# Patient Record
Sex: MALE | Race: WHITE | URBAN - METROPOLITAN AREA
[De-identification: names, ages, dates, MRNs, and addresses within clinical notes are randomized per-mention and may not be internally consistent; named-entity substitution may affect disease eponyms.]

---

## 2017-03-30 PROBLEM — I25.10 CORONARY ARTERY DISEASE INVOLVING NATIVE CORONARY ARTERY OF NATIVE HEART WITHOUT ANGINA PECTORIS: Status: ACTIVE | Noted: 2017-03-30

## 2025-01-22 ENCOUNTER — HOSPITAL ENCOUNTER (OUTPATIENT)
Dept: CT IMAGING | Facility: HOSPITAL | Age: 80
Discharge: HOME | End: 2025-01-22
Attending: SURGERY
Payer: MEDICARE

## 2025-01-22 ENCOUNTER — HOSPITAL ENCOUNTER (OUTPATIENT)
Dept: GENERAL RADIOLOGY | Facility: HOSPITAL | Age: 80
Discharge: HOME | End: 2025-01-22
Attending: SURGERY
Payer: MEDICARE

## 2025-01-22 VITALS
SYSTOLIC BLOOD PRESSURE: 187 MMHG | HEART RATE: 56 BPM | RESPIRATION RATE: 12 BRPM | DIASTOLIC BLOOD PRESSURE: 62 MMHG | OXYGEN SATURATION: 95 %

## 2025-01-22 VITALS
RESPIRATION RATE: 13 BRPM | OXYGEN SATURATION: 96 % | DIASTOLIC BLOOD PRESSURE: 75 MMHG | SYSTOLIC BLOOD PRESSURE: 151 MMHG | HEART RATE: 57 BPM

## 2025-01-22 DIAGNOSIS — I71.41 PARARENAL ABDOMINAL AORTIC ANEURYSM (AAA) WITHOUT RUPTURE: ICD-10-CM

## 2025-01-22 LAB
CREAT BLD-MCNC: 2.4 MG/DL
EGFRCR SERPLBLD CKD-EPI 2021: 27 ML/MIN/1.73M2 (ref 60–?)

## 2025-01-22 PROCEDURE — 82565 ASSAY OF CREATININE: CPT

## 2025-01-22 PROCEDURE — 96361 HYDRATE IV INFUSION ADD-ON: CPT

## 2025-01-22 PROCEDURE — 96360 HYDRATION IV INFUSION INIT: CPT

## 2025-01-22 PROCEDURE — 74174 CTA ABD&PLVS W/CONTRAST: CPT | Performed by: SURGERY

## 2025-01-22 NOTE — IMAGING NOTE
Received SBAR from JOSHUA Stover at 1100.  Patient receiving IV hydration s/p CT scan. Continuous monitoring per protocol, see flowsheet documentation. Hydration completed at 1120. Pt tolerated well. IV discontinued. Patient discharged in stable condition.

## 2025-01-22 NOTE — IMAGING NOTE
Received Jeff in [facility] for post CT IV hydration. Please see flow-sheets and MAR for vital signs and/ or additional information. SBAR given to Jolanta Toussaint RN

## 2025-01-29 RX ORDER — CHOLECALCIFEROL (VITAMIN D3) 25 MCG
1000 TABLET ORAL DAILY
COMMUNITY

## 2025-01-29 RX ORDER — SODIUM BICARBONATE 650 MG/1
1500 TABLET ORAL NIGHTLY
COMMUNITY
End: 2025-02-13 | Stop reason: CLARIF

## 2025-01-29 NOTE — PAT NURSING NOTE
PAT nursing note: spoke with daughter Tip; shower with antibacterial soap; npo after 2200; continue to take all prescribed medications as directed except: the morning of surgery only take Nifedipine and ASA  with a sip of water; last dose of vitamins, supplements, Omega 3 fish oil, herbal products and NSAIDs 6/12, ASA 1/29, Glipizide 2/4 am; denies taking blood thinners, ACEs, ARBs, diabetic or weight loss medications; no PPM, ICD, no nerve or spinal cord stimulator; admit; 2 visitors welcome; park in the [place] [employer] garage at [facility]; register at the [facility] reception desk in the lobby at 0630 am; keep personal possessions to a minimum: bring insurance card(s)/picture ID, slippers, toiletries, wear comfortable clothing, bring glasses/eye glass case, bring denture cup/supplies; leave all valuables at home, including jewelry; discussed intra-op and post-op instructions; all questions answered; daughter verbalized understanding of all instructions; labs/EKG 1/31 @0900/[facility].

## 2025-01-31 ENCOUNTER — HOSPITAL ENCOUNTER (OUTPATIENT)
Dept: LAB | Facility: HOSPITAL | Age: 80
Discharge: HOME | End: 2025-01-31
Attending: SURGERY
Payer: MEDICARE

## 2025-01-31 ENCOUNTER — HOSPITAL ENCOUNTER (OUTPATIENT)
Dept: CV DIAGNOSTICS | Facility: HOSPITAL | Age: 80
Discharge: HOME | End: 2025-01-31
Attending: SURGERY
Payer: MEDICARE

## 2025-01-31 DIAGNOSIS — I71.40 AAA (ABDOMINAL AORTIC ANEURYSM): ICD-10-CM

## 2025-01-31 DIAGNOSIS — Z01.818 PRE-OP TESTING: ICD-10-CM

## 2025-01-31 LAB
ALBUMIN SERPL-MCNC: 4.4 G/DL (ref 3.2–4.8)
ALBUMIN/GLOB SERPL: 1.4 {RATIO} (ref 1–2)
ALP LIVER SERPL-CCNC: 80 U/L
ALT SERPL-CCNC: 13 U/L
ANION GAP SERPL CALC-SCNC: 7 MMOL/L (ref 0–18)
ANTIBODY SCREEN: NEGATIVE
APTT PPP: 23.3 SECONDS (ref 23–36)
AST SERPL-CCNC: 24 U/L (ref ?–34)
ATRIAL RATE: 67 BPM
BASOPHILS # BLD AUTO: 0.09 X10(3) UL (ref 0–0.2)
BASOPHILS NFR BLD AUTO: 1.2 %
BILIRUB SERPL-MCNC: 0.4 MG/DL (ref 0.2–1.1)
BUN BLD-MCNC: 29 MG/DL (ref 9–23)
CALCIUM BLD-MCNC: 9.4 MG/DL (ref 8.7–10.6)
CHLORIDE SERPL-SCNC: 106 MMOL/L (ref 98–112)
CO2 SERPL-SCNC: 25 MMOL/L (ref 21–32)
CREAT BLD-MCNC: 1.91 MG/DL
EGFRCR SERPLBLD CKD-EPI 2021: 35 ML/MIN/1.73M2 (ref 60–?)
EOSINOPHIL # BLD AUTO: 0.69 X10(3) UL (ref 0–0.7)
EOSINOPHIL NFR BLD AUTO: 8.9 %
ERYTHROCYTE [DISTWIDTH] IN BLOOD BY AUTOMATED COUNT: 14.6 %
GLOBULIN PLAS-MCNC: 3.1 G/DL (ref 2–3.5)
GLUCOSE BLD-MCNC: 143 MG/DL (ref 70–99)
HCT VFR BLD AUTO: 38 %
HGB BLD-MCNC: 12.8 G/DL
IMM GRANULOCYTES # BLD AUTO: 0.02 X10(3) UL (ref 0–1)
IMM GRANULOCYTES NFR BLD: 0.3 %
INR BLD: 1.07 (ref 0.8–1.2)
LYMPHOCYTES # BLD AUTO: 2.18 X10(3) UL (ref 1–4)
LYMPHOCYTES NFR BLD AUTO: 28.1 %
MCH RBC QN AUTO: 35.4 PG (ref 26–34)
MCHC RBC AUTO-ENTMCNC: 33.7 G/DL (ref 31–37)
MCV RBC AUTO: 105 FL
MONOCYTES # BLD AUTO: 0.82 X10(3) UL (ref 0.1–1)
MONOCYTES NFR BLD AUTO: 10.6 %
NEUTROPHILS # BLD AUTO: 3.95 X10 (3) UL (ref 1.5–7.7)
NEUTROPHILS # BLD AUTO: 3.95 X10(3) UL (ref 1.5–7.7)
NEUTROPHILS NFR BLD AUTO: 50.9 %
OSMOLALITY SERPL CALC.SUM OF ELEC: 294 MOSM/KG (ref 275–295)
P AXIS: 63 DEGREES
P-R INTERVAL: 192 MS
PLATELET # BLD AUTO: 202 10(3)UL (ref 150–450)
POTASSIUM SERPL-SCNC: 4.2 MMOL/L (ref 3.5–5.1)
PROT SERPL-MCNC: 7.5 G/DL (ref 5.7–8.2)
PROTHROMBIN TIME: 14 SECONDS (ref 11.6–14.8)
Q-T INTERVAL: 412 MS
QRS DURATION: 126 MS
QTC CALCULATION (BEZET): 435 MS
R AXIS: 21 DEGREES
RBC # BLD AUTO: 3.62 X10(6)UL
RH BLOOD TYPE: POSITIVE
SODIUM SERPL-SCNC: 138 MMOL/L (ref 136–145)
T AXIS: 55 DEGREES
VENTRICULAR RATE: 67 BPM
WBC # BLD AUTO: 7.8 X10(3) UL (ref 4–11)

## 2025-01-31 PROCEDURE — 36415 COLL VENOUS BLD VENIPUNCTURE: CPT | Performed by: SURGERY

## 2025-01-31 PROCEDURE — 86901 BLOOD TYPING SEROLOGIC RH(D): CPT | Performed by: SURGERY

## 2025-01-31 PROCEDURE — 93005 ELECTROCARDIOGRAM TRACING: CPT

## 2025-01-31 PROCEDURE — 85730 THROMBOPLASTIN TIME PARTIAL: CPT | Performed by: SURGERY

## 2025-01-31 PROCEDURE — 80053 COMPREHEN METABOLIC PANEL: CPT | Performed by: SURGERY

## 2025-01-31 PROCEDURE — 85025 COMPLETE CBC W/AUTO DIFF WBC: CPT | Performed by: SURGERY

## 2025-01-31 PROCEDURE — 86850 RBC ANTIBODY SCREEN: CPT | Performed by: SURGERY

## 2025-01-31 PROCEDURE — 85610 PROTHROMBIN TIME: CPT | Performed by: SURGERY

## 2025-01-31 PROCEDURE — 93010 ELECTROCARDIOGRAM REPORT: CPT | Performed by: INTERNAL MEDICINE

## 2025-01-31 PROCEDURE — 86900 BLOOD TYPING SEROLOGIC ABO: CPT | Performed by: SURGERY

## 2025-01-31 PROCEDURE — 86920 COMPATIBILITY TEST SPIN: CPT

## 2025-02-04 ENCOUNTER — ANESTHESIA EVENT (OUTPATIENT)
Dept: CARDIAC SURGERY | Facility: HOSPITAL | Age: 80
End: 2025-02-04
Payer: MEDICARE

## 2025-02-04 NOTE — H&P
[facility]    PATIENT'S NAME: TIFFANIE SIERRA   ATTENDING PHYSICIAN: Eduin Hatch M.D.   PATIENT ACCOUNT#:   [id number]    LOCATION:    MEDICAL RECORD #:   [id number]       YOB: 1945  ADMISSION DATE:       02/05/2025    HISTORY AND PHYSICAL EXAMINATION    HISTORY OF PRESENT ILLNESS:  An 79-year-old white male referred by Dr. Geo Najera and Dr. Kevin Del Toro.  The patient has an infrarenal aortic aneurysm, about 5.3 to 5.4 cm.  Currently, he has no abdominal pain, no back pain.  He has really 1 functioning kidney, and it is his left kidney, with an accessory renal artery on the left side.  The patient had been seen before by my partner, Dr. Wheelre, for a 4.5 cm back in 2019, referred back to us again because of enlargement.      PAST MEDICAL HISTORY:  He has a history of some difficulty with his right knee and his right hip and difficulty with dorsiflexion of that right foot.      PAST SURGICAL HISTORY:  The patient has had coronary artery bypass surgery by Dr. Wheeler back in 2004, no other cardiac intervention since that time.  He had bilateral total knee replacement surgeries.      MEDICATIONS:  Metoprolol succinate, nifedipine, tramadol, allopurinol, glipizide, atorvastatin, aspirin, and Zetia.      ALLERGIES:  He has no known allergies.      SOCIAL HISTORY:  He is  with 3 children.  His wife is at home.  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  He is a [place]  too.  He quit smoking in 2004.  No EtOH abuse or drug abuse.      REVIEW OF SYSTEMS:  Currently, no gangrene, tissue loss, ulceration, or rest pain lower legs.  Currently, no chest pain, shortness of breath, or previous MI.  He has been cleared by Cardiology for the surgery. He has no CVA, TIA, visual field defect, or aphasia.  He denies any hematuria, dysuria, hemoptysis, cough, or sputum production.  No weight loss, fever, or chills.  No hematemesis or bright red blood per rectum.         PHYSICAL EXAMINATION:    GENERAL:  He is awake, alert.  He is appropriate, in no acute distress.    VITAL SIGNS:  Blood pressure 130/70, heart rate 72, respirations 20.    HEENT:  Pupils are equal and round.  Sclerae clear.  Mouth without lesions.   NECK:  No cervical bruit.  No JVD.  LUNGS:  Clear to auscultation.  No rales or rhonchi.  HEART:  Normal, no murmur.  ABDOMEN:  Soft.  No tenderness or masses.  The aorta is palpable, nontender, maybe slightly enlarged.    EXTREMITIES:  Femoral, popliteal, and pedal pulses palpable.  The pedal might be slightly diminished bilaterally.  Upper extremity pulses are palpable.  NEUROLOGIC:  He is moving all 4 extremities.  He has some problems with the right foot where it is slightly weak with neurological function of both the foot, knee, and the hip.  Some difficulty with ambulating.    LABORATORY DATA:  His creatinine goes up as high as 1.9 and GFR 35.    CT angiogram reviewed, which shows about a 5.3 to 5.4 cm aneurysm with a neck.      IMPRESSION:  I have recommended stent graft repair of the aortic aneurysm with the risks and complications including death, myocardial infarction, bleeding, infection, stroke, limb loss, graft thrombosis, future graft thrombosis, endoleak, graft structural fatigue, renal failure, colon ischemia, multisystem organ failure, and a need for followup.  He is aware of the possibility for endoleak and treatment in the future.  The option for no treatment versus open repair was also discussed.  The patient had all questions answered.    Dictated By Eduin Hatch M.D.  d: 02/04/2025 10:46:12  t: 02/04/2025 12:40:57  [facility] [id number]  [facility]/

## 2025-02-05 ENCOUNTER — HOSPITAL ENCOUNTER (INPATIENT)
Facility: HOSPITAL | Age: 80
LOS: 2 days | Discharge: HOME | End: 2025-02-07
Attending: SURGERY | Admitting: SURGERY
Payer: MEDICARE

## 2025-02-05 ENCOUNTER — ANESTHESIA (OUTPATIENT)
Dept: CARDIAC SURGERY | Facility: HOSPITAL | Age: 80
End: 2025-02-05
Payer: MEDICARE

## 2025-02-05 DIAGNOSIS — Z01.818 PRE-OP TESTING: ICD-10-CM

## 2025-02-05 DIAGNOSIS — I71.40 AAA (ABDOMINAL AORTIC ANEURYSM): Primary | ICD-10-CM

## 2025-02-05 PROBLEM — Z98.890 STATUS POST VASCULAR SURGERY: Status: ACTIVE | Noted: 2025-02-05

## 2025-02-05 PROBLEM — E11.42 TYPE 2 DIABETES MELLITUS WITH PERIPHERAL NEUROPATHY (HCC): Status: ACTIVE | Noted: 2025-02-05

## 2025-02-05 PROBLEM — I16.0 HYPERTENSIVE URGENCY: Status: ACTIVE | Noted: 2025-02-05

## 2025-02-05 PROBLEM — N18.9 CHRONIC KIDNEY DISEASE: Status: ACTIVE | Noted: 2025-02-05

## 2025-02-05 PROBLEM — I10 PRIMARY HYPERTENSION: Status: ACTIVE | Noted: 2025-02-05

## 2025-02-05 PROBLEM — E78.5 HYPERLIPIDEMIA: Status: ACTIVE | Noted: 2025-02-05

## 2025-02-05 LAB
ANION GAP SERPL CALC-SCNC: 7 MMOL/L (ref 0–18)
APTT PPP: 157.3 SECONDS (ref 23–36)
BUN BLD-MCNC: 24 MG/DL (ref 9–23)
CALCIUM BLD-MCNC: 8.2 MG/DL (ref 8.7–10.6)
CHLORIDE SERPL-SCNC: 109 MMOL/L (ref 98–112)
CO2 SERPL-SCNC: 22 MMOL/L (ref 21–32)
CREAT BLD-MCNC: 1.64 MG/DL
EGFRCR SERPLBLD CKD-EPI 2021: 42 ML/MIN/1.73M2 (ref 60–?)
ERYTHROCYTE [DISTWIDTH] IN BLOOD BY AUTOMATED COUNT: 14.6 %
EST. AVERAGE GLUCOSE BLD GHB EST-MCNC: 126 MG/DL (ref 68–126)
GLUCOSE BLD-MCNC: 132 MG/DL (ref 70–99)
GLUCOSE BLD-MCNC: 142 MG/DL (ref 70–99)
GLUCOSE BLD-MCNC: 201 MG/DL (ref 70–99)
HBA1C MFR BLD: 6 % (ref ?–5.7)
HCT VFR BLD AUTO: 28.5 %
HGB BLD-MCNC: 9.9 G/DL
ISTAT ACTIVATED CLOTTING TIME: 112 SECONDS (ref 74–137)
MCH RBC QN AUTO: 36 PG (ref 26–34)
MCHC RBC AUTO-ENTMCNC: 34.7 G/DL (ref 31–37)
MCV RBC AUTO: 103.6 FL
MRSA DNA SPEC QL NAA+PROBE: NEGATIVE
OSMOLALITY SERPL CALC.SUM OF ELEC: 292 MOSM/KG (ref 275–295)
PLATELET # BLD AUTO: 153 10(3)UL (ref 150–450)
PLATELETS.RETICULATED NFR BLD AUTO: 6 % (ref 0–7)
POTASSIUM SERPL-SCNC: 4.4 MMOL/L (ref 3.5–5.1)
RBC # BLD AUTO: 2.75 X10(6)UL
RH BLOOD TYPE: POSITIVE
SODIUM SERPL-SCNC: 138 MMOL/L (ref 136–145)
WBC # BLD AUTO: 6.7 X10(3) UL (ref 4–11)

## 2025-02-05 PROCEDURE — B41D1ZZ FLUOROSCOPY OF AORTA AND BILATERAL LOWER EXTREMITY ARTERIES USING LOW OSMOLAR CONTRAST: ICD-10-PCS | Performed by: SURGERY

## 2025-02-05 PROCEDURE — 04V03DZ RESTRICTION OF ABDOMINAL AORTA WITH INTRALUMINAL DEVICE, PERCUTANEOUS APPROACH: ICD-10-PCS | Performed by: SURGERY

## 2025-02-05 PROCEDURE — 99223 1ST HOSP IP/OBS HIGH 75: CPT | Performed by: INTERNAL MEDICINE

## 2025-02-05 DEVICE — EXCLUDER CONFORM AAA TRNK ENDO 28.5MMX14.5MMX12CM 16FR
Type: IMPLANTABLE DEVICE | Status: FUNCTIONAL
Brand: GORE EXCLUDER CONF AAA ENDO - TRUNK-IPSILATERAL

## 2025-02-05 DEVICE — EXCLUDER AAA ENDO CONTRA LEG 16MMX20MMX9.5CM 12FR
Type: IMPLANTABLE DEVICE | Status: FUNCTIONAL
Brand: GORE EXCLUDER AAA ENDOPROSTHESIS

## 2025-02-05 DEVICE — EXCLUDER AAA ENDO CONTRA LEG 16MMX16MMX13.5CM 12FR
Type: IMPLANTABLE DEVICE | Status: FUNCTIONAL
Brand: GORE EXCLUDER AAA ENDOPROSTHESIS

## 2025-02-05 DEVICE — ANGIO-SEAL VIP VASCULAR CLOSURE DEVICE
Type: IMPLANTABLE DEVICE | Site: GROIN | Status: FUNCTIONAL
Brand: ANGIO-SEAL

## 2025-02-05 RX ORDER — ATORVASTATIN CALCIUM 40 MG/1
40 TABLET, FILM COATED ORAL NIGHTLY
Status: DISCONTINUED | OUTPATIENT
Start: 2025-02-05 | End: 2025-02-07

## 2025-02-05 RX ORDER — MORPHINE SULFATE 4 MG/ML
4 INJECTION, SOLUTION INTRAMUSCULAR; INTRAVENOUS EVERY 2 HOUR PRN
Status: DISCONTINUED | OUTPATIENT
Start: 2025-02-05 | End: 2025-02-07

## 2025-02-05 RX ORDER — DEXTROSE MONOHYDRATE 25 G/50ML
50 INJECTION, SOLUTION INTRAVENOUS
Status: DISCONTINUED | OUTPATIENT
Start: 2025-02-05 | End: 2025-02-07

## 2025-02-05 RX ORDER — ONDANSETRON 2 MG/ML
4 INJECTION INTRAMUSCULAR; INTRAVENOUS EVERY 6 HOURS PRN
Status: DISCONTINUED | OUTPATIENT
Start: 2025-02-05 | End: 2025-02-07

## 2025-02-05 RX ORDER — METOPROLOL SUCCINATE 25 MG/1
25 TABLET, EXTENDED RELEASE ORAL EVERY EVENING
Status: DISCONTINUED | OUTPATIENT
Start: 2025-02-06 | End: 2025-02-07

## 2025-02-05 RX ORDER — NITROGLYCERIN 20 MG/100ML
INJECTION INTRAVENOUS
Status: DISCONTINUED | OUTPATIENT
Start: 2025-02-05 | End: 2025-02-06

## 2025-02-05 RX ORDER — GABAPENTIN 100 MG/1
200 CAPSULE ORAL 3 TIMES DAILY
Status: DISCONTINUED | OUTPATIENT
Start: 2025-02-05 | End: 2025-02-07

## 2025-02-05 RX ORDER — FAMOTIDINE 20 MG/1
20 TABLET, FILM COATED ORAL NIGHTLY
Status: DISCONTINUED | OUTPATIENT
Start: 2025-02-05 | End: 2025-02-07

## 2025-02-05 RX ORDER — MIDAZOLAM HYDROCHLORIDE 1 MG/ML
1 INJECTION INTRAMUSCULAR; INTRAVENOUS EVERY 5 MIN PRN
Status: ACTIVE | OUTPATIENT
Start: 2025-02-05 | End: 2025-02-05

## 2025-02-05 RX ORDER — ONDANSETRON 2 MG/ML
INJECTION INTRAMUSCULAR; INTRAVENOUS AS NEEDED
Status: DISCONTINUED | OUTPATIENT
Start: 2025-02-05 | End: 2025-02-05 | Stop reason: SURG

## 2025-02-05 RX ORDER — PROTAMINE SULFATE 10 MG/ML
INJECTION, SOLUTION INTRAVENOUS AS NEEDED
Status: DISCONTINUED | OUTPATIENT
Start: 2025-02-05 | End: 2025-02-05 | Stop reason: SURG

## 2025-02-05 RX ORDER — SODIUM CHLORIDE 9 MG/ML
INJECTION, SOLUTION INTRAVENOUS CONTINUOUS
Status: DISCONTINUED | OUTPATIENT
Start: 2025-02-05 | End: 2025-02-06

## 2025-02-05 RX ORDER — EZETIMIBE 10 MG/1
10 TABLET ORAL NIGHTLY
Status: DISCONTINUED | OUTPATIENT
Start: 2025-02-05 | End: 2025-02-07

## 2025-02-05 RX ORDER — NIFEDIPINE 30 MG/1
30 TABLET, EXTENDED RELEASE ORAL DAILY
Status: DISCONTINUED | OUTPATIENT
Start: 2025-02-06 | End: 2025-02-05

## 2025-02-05 RX ORDER — METOCLOPRAMIDE HYDROCHLORIDE 5 MG/ML
5 INJECTION INTRAMUSCULAR; INTRAVENOUS EVERY 8 HOURS PRN
Status: DISCONTINUED | OUTPATIENT
Start: 2025-02-05 | End: 2025-02-07

## 2025-02-05 RX ORDER — ONDANSETRON 2 MG/ML
4 INJECTION INTRAMUSCULAR; INTRAVENOUS ONCE AS NEEDED
Status: ACTIVE | OUTPATIENT
Start: 2025-02-05 | End: 2025-02-05

## 2025-02-05 RX ORDER — MIDAZOLAM HYDROCHLORIDE 1 MG/ML
INJECTION INTRAMUSCULAR; INTRAVENOUS AS NEEDED
Status: DISCONTINUED | OUTPATIENT
Start: 2025-02-05 | End: 2025-02-05 | Stop reason: SURG

## 2025-02-05 RX ORDER — GLYCOPYRROLATE 0.2 MG/ML
INJECTION, SOLUTION INTRAMUSCULAR; INTRAVENOUS AS NEEDED
Status: DISCONTINUED | OUTPATIENT
Start: 2025-02-05 | End: 2025-02-05 | Stop reason: SURG

## 2025-02-05 RX ORDER — FAMOTIDINE 10 MG/ML
20 INJECTION, SOLUTION INTRAVENOUS NIGHTLY
Status: DISCONTINUED | OUTPATIENT
Start: 2025-02-05 | End: 2025-02-07

## 2025-02-05 RX ORDER — METOCLOPRAMIDE HYDROCHLORIDE 5 MG/ML
5 INJECTION INTRAMUSCULAR; INTRAVENOUS ONCE AS NEEDED
Status: ACTIVE | OUTPATIENT
Start: 2025-02-05 | End: 2025-02-05

## 2025-02-05 RX ORDER — IODIXANOL 320 MG/ML
100 INJECTION, SOLUTION INTRAVASCULAR
Status: COMPLETED | OUTPATIENT
Start: 2025-02-05 | End: 2025-02-05

## 2025-02-05 RX ORDER — MORPHINE SULFATE 2 MG/ML
2 INJECTION, SOLUTION INTRAMUSCULAR; INTRAVENOUS EVERY 2 HOUR PRN
Status: DISCONTINUED | OUTPATIENT
Start: 2025-02-05 | End: 2025-02-07

## 2025-02-05 RX ORDER — NICOTINE POLACRILEX 4 MG
15 LOZENGE BUCCAL
Status: DISCONTINUED | OUTPATIENT
Start: 2025-02-05 | End: 2025-02-07

## 2025-02-05 RX ORDER — HYDROCODONE BITARTRATE AND ACETAMINOPHEN 5; 325 MG/1; MG/1
2 TABLET ORAL EVERY 4 HOURS PRN
Status: DISCONTINUED | OUTPATIENT
Start: 2025-02-05 | End: 2025-02-07

## 2025-02-05 RX ORDER — ASPIRIN 81 MG/1
81 TABLET ORAL DAILY
Status: DISCONTINUED | OUTPATIENT
Start: 2025-02-05 | End: 2025-02-07

## 2025-02-05 RX ORDER — DEXAMETHASONE SODIUM PHOSPHATE 4 MG/ML
VIAL (ML) INJECTION AS NEEDED
Status: DISCONTINUED | OUTPATIENT
Start: 2025-02-05 | End: 2025-02-05 | Stop reason: SURG

## 2025-02-05 RX ORDER — SODIUM BICARBONATE 325 MG/1
325 TABLET ORAL DAILY
Status: DISCONTINUED | OUTPATIENT
Start: 2025-02-05 | End: 2025-02-07

## 2025-02-05 RX ORDER — MORPHINE SULFATE 2 MG/ML
1 INJECTION, SOLUTION INTRAMUSCULAR; INTRAVENOUS EVERY 2 HOUR PRN
Status: DISCONTINUED | OUTPATIENT
Start: 2025-02-05 | End: 2025-02-07

## 2025-02-05 RX ORDER — CLOPIDOGREL BISULFATE 75 MG/1
75 TABLET ORAL DAILY
Status: DISCONTINUED | OUTPATIENT
Start: 2025-02-05 | End: 2025-02-07

## 2025-02-05 RX ORDER — TRAMADOL HYDROCHLORIDE 50 MG/1
50 TABLET ORAL 2 TIMES DAILY PRN
Status: DISCONTINUED | OUTPATIENT
Start: 2025-02-05 | End: 2025-02-07

## 2025-02-05 RX ORDER — ACETAMINOPHEN 325 MG/1
650 TABLET ORAL EVERY 4 HOURS PRN
Status: DISCONTINUED | OUTPATIENT
Start: 2025-02-05 | End: 2025-02-07

## 2025-02-05 RX ORDER — METOPROLOL SUCCINATE 25 MG/1
25 TABLET, EXTENDED RELEASE ORAL EVERY EVENING
Status: DISCONTINUED | OUTPATIENT
Start: 2025-02-05 | End: 2025-02-05

## 2025-02-05 RX ORDER — HEPARIN SODIUM 1000 [USP'U]/ML
INJECTION, SOLUTION INTRAVENOUS; SUBCUTANEOUS AS NEEDED
Status: DISCONTINUED | OUTPATIENT
Start: 2025-02-05 | End: 2025-02-05 | Stop reason: SURG

## 2025-02-05 RX ORDER — DIPHENHYDRAMINE HYDROCHLORIDE 50 MG/ML
12.5 INJECTION INTRAMUSCULAR; INTRAVENOUS AS NEEDED
Status: ACTIVE | OUTPATIENT
Start: 2025-02-05 | End: 2025-02-05

## 2025-02-05 RX ORDER — NALOXONE HYDROCHLORIDE 0.4 MG/ML
0.08 INJECTION, SOLUTION INTRAMUSCULAR; INTRAVENOUS; SUBCUTANEOUS AS NEEDED
Status: ACTIVE | OUTPATIENT
Start: 2025-02-05 | End: 2025-02-05

## 2025-02-05 RX ORDER — SODIUM CHLORIDE 9 MG/ML
INJECTION, SOLUTION INTRAVENOUS CONTINUOUS PRN
Status: DISCONTINUED | OUTPATIENT
Start: 2025-02-05 | End: 2025-02-05 | Stop reason: SURG

## 2025-02-05 RX ORDER — ROCURONIUM BROMIDE 10 MG/ML
INJECTION, SOLUTION INTRAVENOUS AS NEEDED
Status: DISCONTINUED | OUTPATIENT
Start: 2025-02-05 | End: 2025-02-05 | Stop reason: SURG

## 2025-02-05 RX ORDER — HYDROMORPHONE HYDROCHLORIDE 1 MG/ML
0.4 INJECTION, SOLUTION INTRAMUSCULAR; INTRAVENOUS; SUBCUTANEOUS EVERY 5 MIN PRN
Status: ACTIVE | OUTPATIENT
Start: 2025-02-05 | End: 2025-02-05

## 2025-02-05 RX ORDER — METOCLOPRAMIDE HYDROCHLORIDE 5 MG/ML
10 INJECTION INTRAMUSCULAR; INTRAVENOUS ONCE AS NEEDED
Status: DISCONTINUED | OUTPATIENT
Start: 2025-02-05 | End: 2025-02-05

## 2025-02-05 RX ORDER — NICOTINE POLACRILEX 4 MG
30 LOZENGE BUCCAL
Status: DISCONTINUED | OUTPATIENT
Start: 2025-02-05 | End: 2025-02-07

## 2025-02-05 RX ORDER — HEPARIN SODIUM 5000 [USP'U]/ML
5000 INJECTION, SOLUTION INTRAVENOUS; SUBCUTANEOUS ONCE
Status: DISCONTINUED | OUTPATIENT
Start: 2025-02-05 | End: 2025-02-05 | Stop reason: HOSPADM

## 2025-02-05 RX ORDER — HYDROCODONE BITARTRATE AND ACETAMINOPHEN 5; 325 MG/1; MG/1
1 TABLET ORAL EVERY 4 HOURS PRN
Status: DISCONTINUED | OUTPATIENT
Start: 2025-02-05 | End: 2025-02-07

## 2025-02-05 RX ORDER — ALLOPURINOL 100 MG/1
100 TABLET ORAL 2 TIMES DAILY
Status: DISCONTINUED | OUTPATIENT
Start: 2025-02-05 | End: 2025-02-07

## 2025-02-05 RX ORDER — SODIUM CHLORIDE, SODIUM LACTATE, POTASSIUM CHLORIDE, CALCIUM CHLORIDE 600; 310; 30; 20 MG/100ML; MG/100ML; MG/100ML; MG/100ML
INJECTION, SOLUTION INTRAVENOUS CONTINUOUS
Status: DISCONTINUED | OUTPATIENT
Start: 2025-02-05 | End: 2025-02-05

## 2025-02-05 RX ADMIN — HEPARIN SODIUM 11000 UNITS: 1000 INJECTION, SOLUTION INTRAVENOUS; SUBCUTANEOUS at 10:03:00

## 2025-02-05 RX ADMIN — HEPARIN SODIUM 2000 UNITS: 1000 INJECTION, SOLUTION INTRAVENOUS; SUBCUTANEOUS at 10:43:00

## 2025-02-05 RX ADMIN — PROTAMINE SULFATE 40 MG: 10 INJECTION, SOLUTION INTRAVENOUS at 11:10:00

## 2025-02-05 RX ADMIN — GLYCOPYRROLATE 0.3 MG: 0.2 INJECTION, SOLUTION INTRAMUSCULAR; INTRAVENOUS at 09:09:00

## 2025-02-05 RX ADMIN — ROCURONIUM BROMIDE 50 MG: 10 INJECTION, SOLUTION INTRAVENOUS at 08:46:00

## 2025-02-05 RX ADMIN — HEPARIN SODIUM 4000 UNITS: 1000 INJECTION, SOLUTION INTRAVENOUS; SUBCUTANEOUS at 10:16:00

## 2025-02-05 RX ADMIN — DEXAMETHASONE SODIUM PHOSPHATE 4 MG: 4 MG/ML VIAL (ML) INJECTION at 09:31:00

## 2025-02-05 RX ADMIN — SODIUM CHLORIDE: 9 INJECTION, SOLUTION INTRAVENOUS at 11:24:00

## 2025-02-05 RX ADMIN — ONDANSETRON 4 MG: 2 INJECTION INTRAMUSCULAR; INTRAVENOUS at 11:00:00

## 2025-02-05 RX ADMIN — MIDAZOLAM HYDROCHLORIDE 2 MG: 1 INJECTION INTRAMUSCULAR; INTRAVENOUS at 08:42:00

## 2025-02-05 RX ADMIN — SODIUM CHLORIDE: 9 INJECTION, SOLUTION INTRAVENOUS at 08:40:00

## 2025-02-05 NOTE — PLAN OF CARE
Received patient from OR. Alert and oriented X 4. No signs of distress. Pulse and site checks done per orders. Pt bedrest POD0, Laying flat X 6 hrs. Advance diet as tolerated. Family in room and updated. Nitroglycerine for hypertension. Kirkpatrick inserted per Dr. Hatch. Medications given per MAR, documentation per flowsheets.

## 2025-02-05 NOTE — ANESTHESIA PREPROCEDURE EVALUATION
PRE-OP EVALUATION    Patient Name: Jeff Morales    Admit Diagnosis: Abdominal Aortic Aneurysm    Pre-op Diagnosis: Abdominal Aortic Aneurysm    ENDOVASCULAR STENT GRAFT REPAIR    Anesthesia Procedure: ENDOVASCULAR STENT GRAFT REPAIR  ANGIOGRAM ADD-ON    Surgeons and Role:     * Eduin Hatch MD - Primary     * Mikey Jordan MD - Assisting Surgeon    Pre-op vitals reviewed.  Temp: 97.9 °F (36.6 °C)  Pulse: 55  Resp: 10  BP: 143/67  SpO2: 96 %  Body mass index is 25.84 kg/m².    Current medications reviewed.  Hospital Medications:  • heparin (Porcine) 5000 UNIT/ML injection 5,000 Units  5,000 Units Subcutaneous Once       Outpatient Medications:   Prescriptions Prior to Admission[1]    Allergies: Patient has no known allergies.      Anesthesia Evaluation    Patient summary reviewed.    Anesthetic Complications  (-) history of anesthetic complications         GI/Hepatic/Renal    Negative GI/hepatic/renal ROS.                             Cardiovascular      ECG reviewed.            (+) hypertension     (+) CAD    (+) CABG/stent (2004)                            Endo/Other      (+) diabetes   not using insulin                         Pulmonary    Negative pulmonary ROS.                       Neuro/Psych    Negative neuro/psych ROS.                              Past Surgical History:   Procedure Laterality Date   • Angiogram      left heart catheterization january 2004   • Back surgery     • Bypass surgery  01/2004    CABG SVG to Diag and Right PDA, [place] to LAD,   • Nasal surg proc unlisted      Nasal surgery       Social History     Socioeconomic History   • Marital status:    Tobacco Use   • Smoking status: Former     Types: Cigarettes   • Smokeless tobacco: Never   Vaping Use   • Vaping status: Never Used   Substance and Sexual Activity   • Alcohol use: Yes     Comment: social   • Drug use: No     History   Drug Use No     Available pre-op labs reviewed.  Lab Results   Component Value Date    WBC 7.8  01/31/2025    RBC 3.62 (L) 01/31/2025    HGB 12.8 (L) 01/31/2025    HCT 38.0 (L) 01/31/2025    .0 (H) 01/31/2025    MCH 35.4 (H) 01/31/2025    MCHC 33.7 01/31/2025    RDW 14.6 01/31/2025    .0 01/31/2025     Lab Results   Component Value Date     01/31/2025    K 4.2 01/31/2025     01/31/2025    CO2 25.0 01/31/2025    BUN 29 (H) 01/31/2025    CREATSERUM 1.91 (H) 01/31/2025     (H) 01/31/2025    CA 9.4 01/31/2025     Lab Results   Component Value Date    INR 1.07 01/31/2025         Airway      Mallampati: II  Mouth opening: 3 FB  TM distance: 4 - 6 cm  Neck ROM: limited Cardiovascular    Cardiovascular exam normal.         Dental      Dental appliance(s): upper dentures, lower dentures and partials       Pulmonary    Pulmonary exam normal.                 Other findings  Only a few remaining teeth      ASA: 3   Plan: general  NPO status verified and patient meets guidelines.  Patient has taken beta blockers in last 24 hours.      Comment: Telephone interview 2/4 19:00. GA with ETT, art line, blood line. Risks discussed including sore throat, harse voice, dental trauma, nausea/vomiting  Plan/risks discussed with: patient, spouse and child/children            Present on Admission:  **None**             [1]   Medications Prior to Admission   Medication Sig Dispense Refill Last Dose/Taking   • Cyanocobalamin (VITAMIN B 12) 500 MCG Oral Tab Take 1,500 mcg by mouth at bedtime.   Past Month   • cholecalciferol 25 MCG (1000 UT) Oral Tab Take 1 tablet (1,000 Units total) by mouth daily.   Past Month   • diphenhydrAMINE HCl (NERVINE OR) Take 1 capsule by mouth in the morning and 1 capsule before bedtime.   More than a month   • METOPROLOL SUCCINATE ER 25 MG Oral Tablet 24 Hr TAKE 1 TABLET BY MOUTH  DAILY (Patient taking differently: Take 1 tablet (25 mg total) by mouth every evening.) 90 tablet 1 2/4/2025   • NIFEdipine ER 30 MG Oral Tablet 24 Hr Take 1 tablet (30 mg total) by mouth daily. 90  tablet 0 2/5/2025 Morning   • gabapentin 100 MG Oral Cap Take 2 capsules (200 mg total) by mouth 3 (three) times daily.   2/4/2025   • traMADol HCl 50 MG Oral Tab Take 1 tablet (50 mg total) by mouth 2 (two) times daily as needed.  0 Taking As Needed   • ezetimibe 10 MG Oral Tab Take 1 tablet (10 mg total) by mouth nightly.   2/4/2025   • allopurinol 100 MG Oral Tab Take 1 tablet (100 mg total) by mouth 2 (two) times daily.   2/4/2025   • Atorvastatin Calcium 40 MG Oral Tab Take 1 tablet (40 mg total) by mouth nightly.   2/4/2025   • aspirin 325 MG Oral Tab EC Take 1 tablet (325 mg total) by mouth daily.   2/5/2025 Morning   • omega-3 fatty acids 1000 MG Oral Cap Take 2,000 mg by mouth 2 (two) times daily. 4 capsules daily   Past Month   • glipiZIDE 5 MG Oral Tablet 24 Hr Take 2 tablets (10 mg total) by mouth daily.   2/4/2025   • sodium bicarbonate 325 MG Oral Tab Take 1 tablet (325 mg total) by mouth daily.   2/4/2025   • Multiple Vitamins-Minerals (MULTIVITAMIN & MINERAL OR) Take 1 tablet by mouth daily.   Past Month

## 2025-02-05 NOTE — CONSULTS
ICU  Critical Care APRN Consult Note    NAME: Jeff Morales - [facility] - MRN: [id number] - Age: 79 year old - :1945    History Of Present Illness:  Jeff Morales is a 79 year old male with PMHx significant for chronic back pain, DM2, CKD3 with history of a solitary kidney (L), HLD, HTN, AAA, retroperitoneal fibrosis, CAD s/p CABG ()  who presented to the ICU 2/5 POD #0 s/p AAA repair with stent graft. Arrives to ICU alert and oriented x4 on room air, with nitroglycerin infusing. Family at bedside.    PMH:  Past Medical History:    Abdominal aneurysm    Aortic insufficiency    Atherosclerosis of coronary artery    Diabetes (HCC)    High blood pressure    High cholesterol    Mitral regurgitation    Neuropathy    Peripheral vascular disease    PVD (peripheral vascular disease)    Tricuspid insufficiency    Unstable angina (HCC)       Social Hx:  Social History     Socioeconomic History    Marital status:    Tobacco Use    Smoking status: Former     Types: Cigarettes    Smokeless tobacco: Never   Vaping Use    Vaping status: Never Used   Substance and Sexual Activity    Alcohol use: Yes     Comment: social    Drug use: No     Social Drivers of Health     Food Insecurity: No Food Insecurity (2025)    NCSS - Food Insecurity     Worried About Running Out of Food in the Last Year: No     Ran Out of Food in the Last Year: No   Transportation Needs: No Transportation Needs (2025)    NCSS - Transportation     Lack of Transportation: No   Housing Stability: Not At Risk (2025)    NCSS - Housing/Utilities     Has Housing: Yes     Worried About Losing Housing: No     Unable to Get Utilities: No       Family Hx:  History reviewed. No pertinent family history.      Review of Systems:   A comprehensive 10 point review of systems was completed.  Pertinent positives and negatives noted in the HPI.    Current Facility-Administered Medications   Medication Dose Route Frequency    atropine 0.1 MG/ML  injection 0.5 mg  0.5 mg Intravenous PRN    naloxone (Narcan) 0.4 MG/ML injection 0.08 mg  0.08 mg Intravenous PRN    ondansetron (Zofran) 4 MG/2ML injection 4 mg  4 mg Intravenous Once PRN    midazolam (Versed) 2 MG/2ML injection 1 mg  1 mg Intravenous Q5 Min PRN    diphenhydrAMINE (Benadryl) 50 mg/mL  injection 12.5 mg  12.5 mg Intravenous PRN    metoclopramide (Reglan) 5 mg/mL injection 5 mg  5 mg Intravenous Once PRN    acetaminophen (Tylenol) tab 650 mg  650 mg Oral Q4H PRN    Or    HYDROcodone-acetaminophen (Norco) 5-325 MG per tab 1 tablet  1 tablet Oral Q4H PRN    Or    HYDROcodone-acetaminophen (Norco) 5-325 MG per tab 2 tablet  2 tablet Oral Q4H PRN    ondansetron (Zofran) 4 MG/2ML injection 4 mg  4 mg Intravenous Q6H PRN    metoclopramide (Reglan) 5 mg/mL injection 5 mg  5 mg Intravenous Q8H PRN    sodium chloride 0.9% infusion   Intravenous Continuous    morphINE PF 2 MG/ML injection 1 mg  1 mg Intravenous Q2H PRN    Or    morphINE PF 2 MG/ML injection 2 mg  2 mg Intravenous Q2H PRN    Or    morphINE PF 4 MG/ML injection 4 mg  4 mg Intravenous Q2H PRN    nitroGLYCERIN in dextrose 5% 50 mg/250mL infusion premix  5-400 mcg/min Intravenous Titrated    niCARdipine in sodium chloride 0.86% (carDENE) 20 mg/200mL infusion premix  5-15 mg/hr Intravenous Continuous PRN    aspirin DR tab 81 mg  81 mg Oral Daily    clopidogrel (Plavix) tab 75 mg  75 mg Oral Daily    famotidine (Pepcid) tab 20 mg  20 mg Oral Nightly    Or    famotidine (Pepcid) 20 mg/2mL injection 20 mg  20 mg Intravenous Nightly    ceFAZolin (Ancef) 2g in 10mL IV syringe premix  2 g Intravenous Q8H    allopurinol (Zyloprim) tab 100 mg  100 mg Oral BID    atorvastatin (Lipitor) tab 40 mg  40 mg Oral Nightly    gabapentin (Neurontin) cap 200 mg  200 mg Oral TID    ezetimibe (Zetia) tab 10 mg  10 mg Oral Nightly    sodium bicarbonate tab 325 mg  325 mg Oral Daily    traMADol (Ultram) tab 50 mg  50 mg Oral BID PRN    metoprolol succinate ER (Toprol XL)  24 hr tab 25 mg  25 mg Oral QPM    glucose (Dex4) 15 GM/59ML oral liquid 15 g  15 g Oral Q15 Min PRN    Or    glucose (Glutose) 40% oral gel 15 g  15 g Oral Q15 Min PRN    Or    glucose-vitamin C (Dex-4) chewable tab 4 tablet  4 tablet Oral Q15 Min PRN    Or    dextrose 50% injection 50 mL  50 mL Intravenous Q15 Min PRN    Or    glucose (Dex4) 15 GM/59ML oral liquid 30 g  30 g Oral Q15 Min PRN    Or    glucose (Glutose) 40% oral gel 30 g  30 g Oral Q15 Min PRN    Or    glucose-vitamin C (Dex-4) chewable tab 8 tablet  8 tablet Oral Q15 Min PRN    insulin aspart (NovoLOG) 100 Units/mL FlexPen 1-10 Units  1-10 Units Subcutaneous TID AC and HS       OBJECTIVE  Vitals:  /72 (BP Location: Right arm)   Pulse 65   Temp 97.1 °F (36.2 °C)   Resp 12   Ht 175.3 cm (5' 9\")   Wt 177 lb 4 oz (80.4 kg)   SpO2 96%   BMI 26.18 kg/m²                Physical Exam:      General: No acute distress  Neuro: AO x3, non focal   Lungs: Clear  Cardio:  RRR  Abdomen: Soft, non tender, non distended  Extremities: Warm, no swelling . RLE bilateral groin sites present, no hematoma.    Data this admission:  CTA ABD/PEL (CPT=74174)    Result Date: 1/22/2025  CONCLUSION:  Infrarenal abdominal aortic aneurysm measuring approximately 5.3 x 4.9 cm over a length of 8.8 cm.  There is eccentric atheromatous plaque along the left lateral margin of the aneurysm lumen.  There is soft tissue in calcification in the retroperitoneum surrounding the abdominal aortic aneurysm and common iliac artery branches most consistent with chronic fibrosis.  No evidence for contrast extravasation to suggest active leak.  Mild wall thickening of proximal sigmoid colon may represent sequela of prior diverticulitis/colitis.  No surrounding inflammatory changes.  Atrophic right kidney.    LOCATION:  [id number]   Dictated by (CST): Sophie Mann MD on 1/22/2025 at 10:42 AM     Finalized by (CST): Sophie Mann MD on 1/22/2025 at 10:51 AM         Labs:  Lab Results    Component Value Date    WBC 6.7 02/05/2025    HGB 9.9 02/05/2025    HCT 28.5 02/05/2025    .0 02/05/2025    CREATSERUM 1.64 02/05/2025    BUN 24 02/05/2025     02/05/2025    K 4.4 02/05/2025     02/05/2025    CO2 22.0 02/05/2025     02/05/2025    CA 8.2 02/05/2025    .3 02/05/2025       No results found.         Assessment/Plan:     AAA POD #0 s/p repair with stent graft  - Blood presssure management per vascular surgery  - ASA/plavix/statin/zetia  - Resume PO HTN meds tomorrow  - nitroglycerin drip, wean as able  - Post-op labs pending  - Pain management  - Encourage IS  - Vascular surgery following  - Cards following    CKD with h/o solitary left kidney  H/o retroperitoneal fibrosis  - Baseline Cr 1.9  - Received 120cc IV contrast for AAA repair  - PO bicarb  - Renal following    HTN  - Resume PO meds tomorrow  - Cards following    Bradycardia  - Prior EKG with NSR rate in the 60s  - Bradycardia on tele now 50s to 60s  - Repeat EKG now  - Monitor    DM2  - A1c pending  - PTA PO meds on hold  - Accuchecks  - SSI    CAD s/o CABG  - ASA/plavix/statin/BB as above    F/E/N  - Replete electrolytes per protocol  - ADAT    Ppx  - dvt chemo ppx when ok with vascular surgery  - SCDs  - PT/OT    Lines/drains  - NAFISA  - Sue Kirkpatrick    Dispo  - Full code  - ICU monitoring        Code Status: Full Code    Plan of care discussed with intensivist, Dr. Siu.      Ade Banegas [facility]  Critical Care  [id number]

## 2025-02-05 NOTE — ANESTHESIA PROCEDURE NOTES
Arterial Line    Date/Time: 2/5/2025 8:50 AM    Performed by: Alvin Muñiz MD  Authorized by: Alvin Muñiz MD    General Information and Staff    Procedure Start:  2/5/2025 8:50 AM  Procedure End:  2/5/2025 8:55 AM  Anesthesiologist:  Alvin Muñiz MD  Performed By:  Anesthesiologist  Patient Location:  OR  Indication: continuous blood pressure monitoring and blood sampling needed    Site Identification: real time ultrasound guided and image stored and retrievable    Preanesthetic Checklist: 2 patient identifiers, IV checked, risks and benefits discussed, monitors and equipment checked, pre-op evaluation, timeout performed, anesthesia consent and sterile technique used    Procedure Details    Catheter Size:  20 G  Catheter Length:  1 and 3/4 inch  Catheter Type:  Arrow  Seldinger Technique?: Yes    Laterality:  Left  Site:  Radial artery  Site Prep: alcohol swabs and chlorhexidine    Line Secured:  Tape and Tegaderm    Assessment    Events: patient tolerated procedure well with no complications      Medications  2/5/2025 8:50 AM      Additional Comments

## 2025-02-05 NOTE — ANESTHESIA PROCEDURE NOTES
Peripheral IV  Date/Time: 2/5/2025 9:00 AM  Inserted by: Alvin Muñiz MD    Placement  Needle size: 18 G  Laterality: right  Location: hand  Local anesthetic: none  Site prep: alcohol  Technique: anatomical landmarks  Attempts: 1

## 2025-02-05 NOTE — CONSULTS
[facility]  Report of Consultation    Jeff Morales Patient Status:  Inpatient    1945 MRN [id number]   Location [facility] 4SW-A Attending Eduin Hatch MD   Hosp Day # 0 PCP Hina Comer MD     Reason for Consultation:  CKD- solitary kidney; s/p AAA repair w/ stent graft    History of Present Illness:  Jeff Morales is a a(n) 79 year old male with hx of solitary kidney (L), CKD- baseline Cr ~ 1.9, HTN, DM, CAD s/p CABG, AAA - who is admitted so ICU s/p stent graft repair of the 5.8 cm AAA- 120 ml contrast used;   Patient is doing well in ICU currently   No complains  Denies any leg pain/numbness  No abd pain  Denies any n/v   No f/c    Pt follows w/ Dr. 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- s/p prior ureteral stent w/ no sig change in renal parameters and stent ws then removed  Off acei/arb due to hyperkalemia      History:  Past Medical History:    Abdominal aneurysm    Aortic insufficiency    Atherosclerosis of coronary artery    Diabetes (HCC)    High blood pressure    High cholesterol    Mitral regurgitation    Neuropathy    Peripheral vascular disease    PVD (peripheral vascular disease)    Tricuspid insufficiency    Unstable angina (HCC)     Past Surgical History:   Procedure Laterality Date    Angiogram      left heart catheterization 2004    Back surgery      Bypass surgery  2004    CABG SVG to Diag and Right PDA, [place] to LAD,    Nasal surg proc unlisted      Nasal surgery       History reviewed. No pertinent family history.   reports that he has quit smoking. His smoking use included cigarettes. He has never used smokeless tobacco. He reports current alcohol use. He reports that he does not use drugs.    Allergies:  Allergies[1]    Current Medications:    Current Facility-Administered Medications:     atropine 0.1 MG/ML injection 0.5 mg, 0.5 mg, Intravenous, PRN    naloxone (Narcan) 0.4 MG/ML injection 0.08 mg, 0.08 mg,  Intravenous, PRN    HYDROmorphone (Dilaudid) 1 MG/ML injection 0.4 mg, 0.4 mg, Intravenous, Q5 Min PRN    fentaNYL (Sublimaze) 50 mcg/mL injection 25 mcg, 25 mcg, Intravenous, Q5 Min PRN    ondansetron (Zofran) 4 MG/2ML injection 4 mg, 4 mg, Intravenous, Once PRN    midazolam (Versed) 2 MG/2ML injection 1 mg, 1 mg, Intravenous, Q5 Min PRN    diphenhydrAMINE (Benadryl) 50 mg/mL  injection 12.5 mg, 12.5 mg, Intravenous, PRN    metoclopramide (Reglan) 5 mg/mL injection 5 mg, 5 mg, Intravenous, Once PRN    acetaminophen (Tylenol) tab 650 mg, 650 mg, Oral, Q4H PRN **OR** HYDROcodone-acetaminophen (Norco) 5-325 MG per tab 1 tablet, 1 tablet, Oral, Q4H PRN **OR** HYDROcodone-acetaminophen (Norco) 5-325 MG per tab 2 tablet, 2 tablet, Oral, Q4H PRN    ondansetron (Zofran) 4 MG/2ML injection 4 mg, 4 mg, Intravenous, Q6H PRN    metoclopramide (Reglan) 5 mg/mL injection 5 mg, 5 mg, Intravenous, Q8H PRN    sodium chloride 0.9% infusion, , Intravenous, Continuous    morphINE PF 2 MG/ML injection 1 mg, 1 mg, Intravenous, Q2H PRN **OR** morphINE PF 2 MG/ML injection 2 mg, 2 mg, Intravenous, Q2H PRN **OR** morphINE PF 4 MG/ML injection 4 mg, 4 mg, Intravenous, Q2H PRN    nitroGLYCERIN in dextrose 5% 50 mg/250mL infusion premix, 5-400 mcg/min, Intravenous, Titrated    niCARdipine in sodium chloride 0.86% (carDENE) 20 mg/200mL infusion premix, 5-15 mg/hr, Intravenous, Continuous PRN    aspirin DR tab 81 mg, 81 mg, Oral, Daily    clopidogrel (Plavix) tab 75 mg, 75 mg, Oral, Daily    famotidine (Pepcid) tab 20 mg, 20 mg, Oral, Nightly **OR** famotidine (Pepcid) 20 mg/2mL injection 20 mg, 20 mg, Intravenous, Nightly    ceFAZolin (Ancef) 2g in 10mL IV syringe premix, 2 g, Intravenous, Q8H    sodium chloride 0.9 % IV bolus 250 mL, 250 mL, Intravenous, Once  Home Medications:  No current outpatient medications on file.       Review of Systems:  See HPI; A total of 12 systems reviewed and otherwise unremarkable.    Physical Exam:  Vital  signs: Blood pressure 123/66, pulse 63, temperature 97.1 °F (36.2 °C), resp. rate 13, height 5' 9\" (1.753 m), weight 177 lb 4 oz (80.4 kg), SpO2 97%.  General: No acute distress. Alert and oriented x 3  HEENT: Moist mucous membranes. EOM-I. PERR  Neck: No lymphadenopathy.  No JVD. No carotid bruits  Respiratory: Clear to auscultation bilaterally.  No wheezes. No rhonchi  Cardiovascular: S1, S2.  Regular rate and rhythm.  No murmurs. Equal pulses   Abdomen: Soft, nontender, nondistended.  Positive bowel sounds. No rebound tenderness   Neurologic: No focal neurological deficits.   Groin site intact; no sig hematoma noted  Musculoskeletal: Full range of motion of all extremities.  No swelling noted.   Integument: No lesions. No erythema.  Psychiatric: Appropriate mood and affect.    Laboratory:  Lab Results   Component Value Date    WBC 6.7 02/05/2025    HGB 9.9 02/05/2025    HCT 28.5 02/05/2025    .0 02/05/2025    .3 02/05/2025          BUN (mg/dL)   Date Value   01/31/2025 29 (H)   04/17/2013 24     Creatinine (mg/dL)   Date Value   01/31/2025 1.91 (H)         Imaging:  Reviewed     Impression:  CKD - stg 3b; pt w/ hx of solitary kidney (L); hx of retroperitoneal fibrosis - noted L hydro (previously evaluated by urology)  Baseline Cr ~ 2  He is s/p AAA repair w/ usage of ~ 120 cc contrast; additionally, a small accessary L renal artery was sacrified w/ the stent repair   - monitor labs  - continue fluids  - risk of ELI reviewed w/ pt  HTN- cntrolled; monitor; BP parameters per Dr. Holden THAO  PAD- s/p AAA repair POD #0  CAD  BMD/Acid- base; on oral bicarb- continue       Thank you for allowing me to participate in this patient's care.  Please feel free to call me with any questions or concerns.    Jason Brewster MD  2/5/2025            [1] No Known Allergies

## 2025-02-05 NOTE — RESPIRATORY THERAPY NOTE
GUNNAR    Patient does not have a previous diagnosis of GUNNAR.    Patient does not routinely use CPAP device at home.    This patient is not suspected to be at high risk for GUNNAR.

## 2025-02-05 NOTE — OPERATIVE REPORT
Pre-op Dx: Enlarging 5.8cm AAA/ Left kidney only. Post-op Dx: Same. Procedure:28.5x14.4x12cm right limb -16Fr [place] Dry [place] sheath. Left [id number]  thru 12 Fr sheath. Right limb extension- [id number]. Perclos times two-R/L groin. 8Fr Angioseal right. 1290cc contrast. 1.5L crystalloid. Surgeon: Holden. EBL- 150cc.fLUORO-24.5/ [id number]

## 2025-02-05 NOTE — CONSULTS
[facility]  Report of Consultation    Jeff Morales Patient Status:  Inpatient    1945 MRN [id number]   Location [facility] 4SW-A Attending Eduin Hatch MD   Hosp Day # 0 PCP Hina Comer MD     Reason for Consultation:  Medical management    REFERRING PHYSICIAN: Eduin Bullock MD    Chief Complaint: Status post endovascular stent graft repair of AAA by vascular surgery Dr. Hatch    History of Present Illness:  Jeff Morales is a  79 year old male admitted  Status post endovascular stent graft repair of AAA by vascular surgery Dr. Hatch.  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  Patient denies any pain.  Denies any nausea vomiting.  Denies any chest pain or shortness of breath.  Denies any abdominal pain.  Tolerating liquid diet      History:  Past Medical History:    Abdominal aneurysm    Aortic insufficiency    Atherosclerosis of coronary artery    Diabetes (HCC)    High blood pressure    High cholesterol    Mitral regurgitation    Neuropathy    Peripheral vascular disease    PVD (peripheral vascular disease)    Tricuspid insufficiency    Unstable angina (HCC)     Past Surgical History:   Procedure Laterality Date    Angiogram      left heart catheterization 2004    Back surgery      Bypass surgery  2004    CABG SVG to Diag and Right PDA, [place] to LAD,    Nasal surg proc unlisted      Nasal surgery       History reviewed. No pertinent family history.  Social History:   reports that he has quit smoking. His smoking use included cigarettes. He has never used smokeless tobacco. He reports current alcohol use. He reports that he does not use drugs.    Allergies:  Allergies[1]    Medications:    Current Facility-Administered Medications:     atropine 0.1 MG/ML injection 0.5 mg, 0.5 mg, Intravenous, PRN    naloxone (Narcan) 0.4 MG/ML  injection 0.08 mg, 0.08 mg, Intravenous, PRN    ondansetron (Zofran) 4 MG/2ML injection 4 mg, 4 mg, Intravenous, Once PRN    midazolam (Versed) 2 MG/2ML injection 1 mg, 1 mg, Intravenous, Q5 Min PRN    diphenhydrAMINE (Benadryl) 50 mg/mL  injection 12.5 mg, 12.5 mg, Intravenous, PRN    metoclopramide (Reglan) 5 mg/mL injection 5 mg, 5 mg, Intravenous, Once PRN    acetaminophen (Tylenol) tab 650 mg, 650 mg, Oral, Q4H PRN **OR** HYDROcodone-acetaminophen (Norco) 5-325 MG per tab 1 tablet, 1 tablet, Oral, Q4H PRN **OR** HYDROcodone-acetaminophen (Norco) 5-325 MG per tab 2 tablet, 2 tablet, Oral, Q4H PRN    ondansetron (Zofran) 4 MG/2ML injection 4 mg, 4 mg, Intravenous, Q6H PRN    metoclopramide (Reglan) 5 mg/mL injection 5 mg, 5 mg, Intravenous, Q8H PRN    sodium chloride 0.9% infusion, , Intravenous, Continuous    morphINE PF 2 MG/ML injection 1 mg, 1 mg, Intravenous, Q2H PRN **OR** morphINE PF 2 MG/ML injection 2 mg, 2 mg, Intravenous, Q2H PRN **OR** morphINE PF 4 MG/ML injection 4 mg, 4 mg, Intravenous, Q2H PRN    nitroGLYCERIN in dextrose 5% 50 mg/250mL infusion premix, 5-400 mcg/min, Intravenous, Titrated    niCARdipine in sodium chloride 0.86% (carDENE) 20 mg/200mL infusion premix, 5-15 mg/hr, Intravenous, Continuous PRN    aspirin DR tab 81 mg, 81 mg, Oral, Daily    clopidogrel (Plavix) tab 75 mg, 75 mg, Oral, Daily    famotidine (Pepcid) tab 20 mg, 20 mg, Oral, Nightly **OR** famotidine (Pepcid) 20 mg/2mL injection 20 mg, 20 mg, Intravenous, Nightly    ceFAZolin (Ancef) 2g in 10mL IV syringe premix, 2 g, Intravenous, Q8H    Review of Systems:  A comprehensive 14 point review of systems was completed.  Pertinent positives and negatives noted in the the HPI.    Physical Exam:  Vital signs: Blood pressure 102/63, pulse 65, temperature 97.1 °F (36.2 °C), resp. rate 12, height 5' 9\" (1.753 m), weight 177 lb 4 oz (80.4 kg), SpO2 96%.  General: No acute distress.   HEENT: Moist mucous membranes.   Respiratory: Clear  to auscultation bilaterally.  No wheezes. No rhonchi.  Cardiovascular: S1, S2.  Regular rate and rhythm.    Abdomen: Soft, nontender, nondistended.  Positive bowel sounds.   Neurologic: Awake alert oriented x 3, no focal neurological deficits.  Musculoskeletal: Full range of motion of all extremities.  No pedal edema or calf tenderness  Integument: Bilateral groin access site with no ecchymosis or hematoma.    Peripheral pulses equally palpable  Psychiatric: Appropriate mood and affect.    Laboratory Data:  Lab Results   Component Value Date    WBC 6.7 02/05/2025    HGB 9.9 02/05/2025    HCT 28.5 02/05/2025    .0 02/05/2025    CREATSERUM 1.64 02/05/2025    BUN 24 02/05/2025     02/05/2025    K 4.4 02/05/2025     02/05/2025    CO2 22.0 02/05/2025     02/05/2025    CA 8.2 02/05/2025    .3 02/05/2025           ASSESSMENT / PLAN:    Status post endovascular stent graft repair of AAA by vascular surgery Dr. Hatch-managed by vascular surgeon    Diabetes mellitus type 2 with peripheral neuropathy  Hyperglycemia protocol  Follow Accu-Cheks  NovoLog correction factor coverage as needed  Hypertension  Continue metoprolol with holding parameters  Follow blood pressure  Hyperlipidemia  Continue statin  CAD with previous CABG  Continue baby aspirin, Plavix, statin, metoprolol  CKD stage III with GFR of 35-42  Follow BMP in a.m.      Quality:  DVT Prophylaxis: SCD per vascular surgeon  CODE status: full  Kirkpatrick: Yes    Plan of care discussed with patient, RN        Thank you for allowing me to participate in the care of your patient. Will continue to follow while in hospital. Advised to follow up with regular PCP Hina Comer MD after discharged from hospital      Andra Lowe MD  2/5/2025  3:11 PM         [1] No Known Allergies

## 2025-02-05 NOTE — ANESTHESIA PROCEDURE NOTES
Airway  Date/Time: 2/5/2025 8:47 AM  Urgency: elective      General Information and Staff    Patient location during procedure: OR  Anesthesiologist: Alvin Muñiz MD  Performed: anesthesiologist   Performed by: Alvin Muñiz MD  Authorized by: Alvin Muñiz MD      Indications and Patient Condition  Indications for airway management: anesthesia  Sedation level: deep  Preoxygenated: yes  Patient position: sniffing  Mask difficulty assessment: 1 - vent by mask    Final Airway Details  Final airway type: endotracheal airway      Successful airway: ETT  Cuffed: yes   Successful intubation technique: Video laryngoscopy  Facilitating devices/methods: intubating stylet  Endotracheal tube insertion site: oral  Blade: GlideScope  Blade size: #3  ETT size (mm): 7.5    Cormack-Lehane Classification: grade IIA - partial view of glottis  Placement verified by: capnometry   Measured from: lips  ETT to lips (cm): 22  Number of attempts at approach: 1  Number of other approaches attempted: 0

## 2025-02-05 NOTE — CONSULTS
Cardiology Consultation Note      Jeff Morales Patient Status:  Inpatient    1945 MRN [id number]   Location [facility] 4SW-A Attending Eduin Hatch MD   Hosp Day # 0 PCP Hina Comer MD     Reason for consultation:  Post op    Impression:  Dr. Hatch: s/p stent graft repair of AAA  CABG  DMII  ICD  HLD  CKD III    Plan:  Post op: doing well and hemodynamically stable  Continue DAPT  Hold off on BB and home CCB for today since he is on IV NTG. Goal BP per vascular is 110-160 mmHg. Restart oral medications tomorrow   Tele  Echo done in  with normal LV function and no significant valve disease         History of Present Illness:  Jeff Morales is a 79 year old male who presented to [facility] on 2025.    This is a patient of my partner: Dr. Villeda .    The patient has a complex past medical history as above who presents for AAA repair with Dr. Arnold.  Currently doing well in the ICU without any issues.  He had an uncomplicated surgery..    Cardiology consultation was requested.    Medications:  Current Facility-Administered Medications   Medication Dose Route Frequency    atropine 0.1 MG/ML injection 0.5 mg  0.5 mg Intravenous PRN    naloxone (Narcan) 0.4 MG/ML injection 0.08 mg  0.08 mg Intravenous PRN    ondansetron (Zofran) 4 MG/2ML injection 4 mg  4 mg Intravenous Once PRN    midazolam (Versed) 2 MG/2ML injection 1 mg  1 mg Intravenous Q5 Min PRN    diphenhydrAMINE (Benadryl) 50 mg/mL  injection 12.5 mg  12.5 mg Intravenous PRN    metoclopramide (Reglan) 5 mg/mL injection 5 mg  5 mg Intravenous Once PRN    acetaminophen (Tylenol) tab 650 mg  650 mg Oral Q4H PRN    Or    HYDROcodone-acetaminophen (Norco) 5-325 MG per tab 1 tablet  1 tablet Oral Q4H PRN    Or    HYDROcodone-acetaminophen (Norco) 5-325 MG per tab 2 tablet  2 tablet Oral Q4H PRN    ondansetron (Zofran) 4 MG/2ML injection 4 mg  4 mg Intravenous Q6H PRN    metoclopramide (Reglan) 5 mg/mL injection 5 mg  5 mg  Intravenous Q8H PRN    sodium chloride 0.9% infusion   Intravenous Continuous    morphINE PF 2 MG/ML injection 1 mg  1 mg Intravenous Q2H PRN    Or    morphINE PF 2 MG/ML injection 2 mg  2 mg Intravenous Q2H PRN    Or    morphINE PF 4 MG/ML injection 4 mg  4 mg Intravenous Q2H PRN    nitroGLYCERIN in dextrose 5% 50 mg/250mL infusion premix  5-400 mcg/min Intravenous Titrated    niCARdipine in sodium chloride 0.86% (carDENE) 20 mg/200mL infusion premix  5-15 mg/hr Intravenous Continuous PRN    aspirin DR tab 81 mg  81 mg Oral Daily    clopidogrel (Plavix) tab 75 mg  75 mg Oral Daily    famotidine (Pepcid) tab 20 mg  20 mg Oral Nightly    Or    famotidine (Pepcid) 20 mg/2mL injection 20 mg  20 mg Intravenous Nightly    ceFAZolin (Ancef) 2g in 10mL IV syringe premix  2 g Intravenous Q8H       Past Medical History:    Abdominal aneurysm    Aortic insufficiency    Atherosclerosis of coronary artery    Diabetes (HCC)    High blood pressure    High cholesterol    Mitral regurgitation    Neuropathy    Peripheral vascular disease    PVD (peripheral vascular disease)    Tricuspid insufficiency    Unstable angina (HCC)       Past Surgical History:   Procedure Laterality Date    Angiogram      left heart catheterization january 2004    Back surgery      Bypass surgery  01/2004    CABG SVG to Diag and Right PDA, [place] to LAD,    Nasal surg proc unlisted      Nasal surgery         Family History  There is no family history of sudden cardiac death.    Social History   reports that he has quit smoking. His smoking use included cigarettes. He has never used smokeless tobacco. He reports current alcohol use. He reports that he does not use drugs.     Allergies  Allergies[1]      Review of Systems:  Constitutional: negative for fevers  Eyes: negative for visual disturbance  Ears, nose, mouth, throat, and face: negative for epistaxis  Respiratory: negative for dyspnea on exertion  Cardiovascular: negative for chest  pain  Gastrointestinal: negative for melena  Genitourinary:negative for hematuria  Hematologic/lymphatic: negative for bleeding  Musculoskeletal:negative for myalgias  Neurological: negative for dizziness and headaches  Endocrine: negative for temperature intolerance      Physical Exam:  Blood pressure 152/74, pulse 60, temperature 97.1 °F (36.2 °C), resp. rate 17, height 5' 9\" (1.753 m), weight 177 lb 4 oz (80.4 kg), SpO2 95%.  Temp (24hrs), Av.5 °F (36.4 °C), Min:97.1 °F (36.2 °C), Max:97.9 °F (36.6 °C)    Wt Readings from Last 3 Encounters:   25 177 lb 4 oz (80.4 kg)   22 187 lb (84.8 kg)   21 184 lb (83.5 kg)       General: Awake and alert; in no acute distress  HEENT: Extraocular movements are intact; sclerae are anicteric; scalp is atrauamatic; no thyromegaly  Neck: Supple; no JVD; no carotid bruits  Cardiac: Regular rate and regular rhythm; no murmurs/rubs/gallops are appreciated; PMI is non-displaced; there is no evidence of a sternal heave  Lungs: Clear to auscultation bilaterally; no accessory muscle use is noted  Abdomen: Soft, non-tender; bowel sounds are normoactive; no hepatosplenomegaly  Extremities: No clubbing or cyanosis; moves all 4 extremities normally  Psychiatric: Normal mood and affect; answers questions appropriately  Dermatologic: No rashes; normal skin turgor    Diagnostic testing:    EKG: Normal sinus rhythm    Labs:   Lab Results   Component Value Date    INR 1.07 2025        Lab Results   Component Value Date    WBC 6.7 2025    HGB 9.9 2025    HCT 28.5 2025    .0 2025    CREATSERUM 1.64 2025    BUN 24 2025     2025    K 4.4 2025     2025    CO2 22.0 2025     2025    CA 8.2 2025    .3 2025         Thank you for allowing our practice to participate in the care of your patient. Please do not hesitate to contact me if you have any questions.    Jose Manuel  MD [place]           [1] No Known Allergies

## 2025-02-05 NOTE — OPERATIVE REPORT
Pre-op Dx: enlarging AAA- 5.8CMPost-op Dx: Same with only one functioning kidney- leftProcedure: Stent graft repair AAARight- 16Fr sheath- 28.5x14.5x12cm. Left [id number] thru 12 Fr [place] sheath. Right extension- [id number]. Surgeon: Holden. 120cc contrast. Yffaao42.8min.DAP-[id number] crystalloid.

## 2025-02-05 NOTE — ANESTHESIA POSTPROCEDURE EVALUATION
[facility]    Jeff Morales Patient Status:  Inpatient   Age/Gender 79 year old male MRN [id number]   Location [facility] 4SW-A Attending Eduin Hatch MD   Hosp Day # 0 PCP Hina Comer MD       Anesthesia Post-op Note    ENDOVASCULAR STENT GRAFT REPAIR; SEE CATH LAB CHARTING FOR ADDITIONAL DETAILS    Procedure Summary       Date: 02/05/25 Room / Location:  [facility] 03 HYBRID /  [facility]    Anesthesia Start: 0840 Anesthesia Stop:     Procedures:       ENDOVASCULAR STENT GRAFT REPAIR; SEE CATH LAB CHARTING FOR ADDITIONAL DETAILS      ANGIOGRAM ADD-ON Diagnosis: (Abdominal Aortic Aneurysm)    Surgeons: Eduin Hatch MD Anesthesiologist: Alvin Muñiz MD    Anesthesia Type: general ASA Status: 3            Anesthesia Type: general    Vitals Value Taken Time   /70 02/05/25 1145   Temp 97.1 02/05/25 1145   Pulse 84 02/05/25 1145   Resp 25 02/05/25 1145   SpO2 99 % 02/05/25 1145   Vitals shown include unfiled device data.        Patient Location: ICU    Anesthesia Type: general    Airway Patency: patent and extubated    Postop Pain Control: adequate    Mental Status: mildly sedated but able to meaningfully participate in the post-anesthesia evaluation    Nausea/Vomiting: none    Cardiopulmonary/Hydration status: stable euvolemic    Complications: no apparent anesthesia related complications    Postop vital signs: stable    Dental Exam: Unchanged from Preop

## 2025-02-06 PROBLEM — I15.9 SECONDARY HYPERTENSION: Status: ACTIVE | Noted: 2025-02-06

## 2025-02-06 LAB
ALBUMIN SERPL-MCNC: 2.9 G/DL (ref 3.2–4.8)
ALBUMIN/GLOB SERPL: 1.4 {RATIO} (ref 1–2)
ALP LIVER SERPL-CCNC: 57 U/L
ALT SERPL-CCNC: <7 U/L
ANION GAP SERPL CALC-SCNC: 7 MMOL/L (ref 0–18)
APTT PPP: 27 SECONDS (ref 23–36)
AST SERPL-CCNC: 13 U/L (ref ?–34)
ATRIAL RATE: 61 BPM
ATRIAL RATE: 88 BPM
BASE EXCESS BLD CALC-SCNC: -4 MMOL/L
BILIRUB SERPL-MCNC: 0.3 MG/DL (ref 0.2–1.1)
BUN BLD-MCNC: 26 MG/DL (ref 9–23)
CA-I BLD-SCNC: 1.19 MMOL/L (ref 1.12–1.32)
CALCIUM BLD-MCNC: 8 MG/DL (ref 8.7–10.6)
CHLORIDE SERPL-SCNC: 110 MMOL/L (ref 98–112)
CO2 BLD-SCNC: 22 MMOL/L (ref 22–32)
CO2 SERPL-SCNC: 22 MMOL/L (ref 21–32)
CREAT BLD-MCNC: 1.81 MG/DL
EGFRCR SERPLBLD CKD-EPI 2021: 37 ML/MIN/1.73M2 (ref 60–?)
ERYTHROCYTE [DISTWIDTH] IN BLOOD BY AUTOMATED COUNT: 14.5 %
GLOBULIN PLAS-MCNC: 2.1 G/DL (ref 2–3.5)
GLUCOSE BLD-MCNC: 106 MG/DL (ref 70–99)
GLUCOSE BLD-MCNC: 114 MG/DL (ref 70–99)
GLUCOSE BLD-MCNC: 136 MG/DL (ref 70–99)
GLUCOSE BLD-MCNC: 86 MG/DL (ref 70–99)
GLUCOSE BLD-MCNC: 95 MG/DL (ref 70–99)
GLUCOSE BLD-MCNC: 98 MG/DL (ref 70–99)
HCO3 BLD-SCNC: 21.2 MEQ/L
HCT VFR BLD AUTO: 23.1 %
HCT VFR BLD CALC: 32 %
HGB BLD-MCNC: 7.9 G/DL
ISTAT ACTIVATED CLOTTING TIME: 147 SECONDS (ref 74–137)
ISTAT ACTIVATED CLOTTING TIME: 193 SECONDS (ref 74–137)
ISTAT ACTIVATED CLOTTING TIME: 216 SECONDS (ref 74–137)
MAGNESIUM SERPL-MCNC: 1.6 MG/DL (ref 1.6–2.6)
MCH RBC QN AUTO: 35.7 PG (ref 26–34)
MCHC RBC AUTO-ENTMCNC: 34.2 G/DL (ref 31–37)
MCV RBC AUTO: 104.5 FL
OSMOLALITY SERPL CALC.SUM OF ELEC: 292 MOSM/KG (ref 275–295)
P AXIS: 53 DEGREES
P AXIS: 70 DEGREES
P-R INTERVAL: 186 MS
P-R INTERVAL: 196 MS
PCO2 BLD: 34.7 MMHG
PH BLD: 7.4 [PH]
PLATELET # BLD AUTO: 139 10(3)UL (ref 150–450)
PLATELETS.RETICULATED NFR BLD AUTO: 5.6 % (ref 0–7)
PO2 BLD: 378 MMHG
POTASSIUM BLD-SCNC: 4.2 MMOL/L (ref 3.6–5.1)
POTASSIUM SERPL-SCNC: 4.7 MMOL/L (ref 3.5–5.1)
PROT SERPL-MCNC: 5 G/DL (ref 5.7–8.2)
Q-T INTERVAL: 376 MS
Q-T INTERVAL: 442 MS
QRS DURATION: 118 MS
QRS DURATION: 120 MS
QTC CALCULATION (BEZET): 444 MS
QTC CALCULATION (BEZET): 454 MS
R AXIS: 34 DEGREES
R AXIS: 36 DEGREES
RBC # BLD AUTO: 2.21 X10(6)UL
SAO2 % BLD: 100 %
SODIUM BLD-SCNC: 138 MMOL/L (ref 136–145)
SODIUM SERPL-SCNC: 139 MMOL/L (ref 136–145)
T AXIS: 53 DEGREES
T AXIS: 79 DEGREES
VENTRICULAR RATE: 61 BPM
VENTRICULAR RATE: 88 BPM
WBC # BLD AUTO: 8.6 X10(3) UL (ref 4–11)

## 2025-02-06 PROCEDURE — 99233 SBSQ HOSP IP/OBS HIGH 50: CPT | Performed by: INTERNAL MEDICINE

## 2025-02-06 PROCEDURE — 99232 SBSQ HOSP IP/OBS MODERATE 35: CPT | Performed by: INTERNAL MEDICINE

## 2025-02-06 RX ORDER — NIFEDIPINE 30 MG/1
30 TABLET, EXTENDED RELEASE ORAL DAILY
Status: DISCONTINUED | OUTPATIENT
Start: 2025-02-06 | End: 2025-02-07

## 2025-02-06 NOTE — PROGRESS NOTES
Jeff Morales Patient Status:  Inpatient    1945 MRN [id number]   Location [facility] 4SW-A Attending Eduin Hatch MD   Hosp Day # 1 PCP Hina Comer MD     Critical Care Progress Note          Subjective:  No acute events overnight. Working with PT/OT this AM.    Objective:    Medications:   aspirin  81 mg Oral Daily    clopidogrel  75 mg Oral Daily    famotidine  20 mg Oral Nightly    Or    famotidine  20 mg Intravenous Nightly    allopurinol  100 mg Oral BID    atorvastatin  40 mg Oral Nightly    gabapentin  200 mg Oral TID    ezetimibe  10 mg Oral Nightly    sodium bicarbonate  325 mg Oral Daily    insulin aspart  1-10 Units Subcutaneous TID AC and HS    metoprolol succinate ER  25 mg Oral QPM              Intake/Output Summary (Last 24 hours) at 2025 0912  Last data filed at 2025 0400  Gross per 24 hour   Intake 4280.3 ml   Output 1200 ml   Net 3080.3 ml       /65   Pulse 66   Temp 98.2 °F (36.8 °C)   Resp 14   Ht 175.3 cm (5' 9\")   Wt 184 lb 15.5 oz (83.9 kg)   SpO2 92%   BMI 27.31 kg/m²     Physical Exam:   General Appearance: Alert, cooperative, no distress, appears stated age  Neck: No JVD, neck supple, no adenopathy, trachea midline  Lungs: Clear to auscultation bilaterally, respirations unlabored  Heart: Regular rate and rhythm, S1 and S2 normal, no murmur, rub or gallop  Abdomen: Soft, non-tender, bowel sounds active all four quadrants, no masses, no organomegaly  Extremities: Extremities normal, atraumatic, no cyanosis or edema,capillary refill <3 sec.    Pulses: 2+ and symmetric all extremities  Skin: Skin color, texture, turgor normal for ethnicity, no rashes or lesions, warm and dry      Recent Labs   Lab 25  0905 25  1206 25  0402   RBC 3.62*   < > 2.21*   HGB 12.8*   < > 7.9*   HCT 38.0*   < > 23.1*   .0*   < > 104.5*   MCH 35.4*   < > 35.7*   MCHC 33.7   < > 34.2   RDW 14.6   < > 14.5   NEPRELIM 3.95  --   --    WBC 7.8   < > 8.6    .0   < > 139.0*    < > = values in this interval not displayed.     Recent Labs   Lab 01/31/25  0905 02/05/25  1206 02/06/25  0402   * 132* 86   BUN 29* 24* 26*   CREATSERUM 1.91* 1.64* 1.81*   CA 9.4 8.2* 8.0*   ALB 4.4  --  2.9*    138 139   K 4.2 4.4 4.7    109 110   CO2 25.0 22.0 22.0   ALKPHO 80  --  57   AST 24  --  13   ALT 13  --  <7*   BILT 0.4  --  0.3   TP 7.5  --  5.0*     No results for input(s): \"ABGPHT\", \"XGQRGZ3U\", \"KAKKU6R\", \"ABGHCO3\", \"ABGBE\", \"TEMP\", \"RIGO\", \"SITE\", \"DEV\", \"THGB\" in the last 168 hours.    Invalid input(s): \"DGX02XUR\", \"CHOB\"  No results for input(s): \"BNP\" in the last 168 hours.  No results for input(s): \"TROP\", \"CK\" in the last 168 hours.    No results found.       Assessment/Plan:     AAA POD #1 s/p repair with stent graft  - Blood presssure management per vascular surgery  - ASA/plavix/statin/zetia  - Resume PO HTN meds today  - Pain management  - Encourage IS  - Vascular surgery following  - Cards following     CKD with h/o solitary left kidney  H/o retroperitoneal fibrosis  - Baseline Cr 1.9  - Received 120cc IV contrast for AAA repair  - PO bicarb  - Renal following     HTN  - Resume PO meds today  - Cards following     DM2  - A1c 6.0  - PTA PO meds on hold  - Accuchecks  - SSI     CAD s/o CABG  - ASA/plavix/statin/BB as above     F/E/N  - Replete electrolytes per protocol  - ADAT     Ppx  - dvt chemo ppx when ok with vascular surgery  - SCDs  - PT/OT     Lines/drains  - PIV  - Sue Kirkpatrick     Dispo  - Full code  - ICU monitoring           Code Status: DNAR/select     Plan of care discussed with intensivist, Dr. Silke Banegas [facility]  Critical Care  [id number]      INTENSIVIST ADDENDUM      I agree with critical care APN note above. I have independently seen & evaluated the patient.  I agree with the management plan outlined above with the following changes/additions:      He had some right foot pain overnight. No other pain or  shortness of breath.      Plan:  -resume po antihypertensive agents   -analgesic prn   -physical therapy/OT  -transfer to floor or discharge when ok with surgery    Nilesh Edouard M.D.  Pulmonary/Critical Care   On call Intensivist 02/06/25

## 2025-02-06 NOTE — PROGRESS NOTES
[facility]  Progress Note    Jeff Morales Patient Status:  Inpatient    1945 MRN [id number]   Formerly [facility] 4SW-A Attending Eduin Hatch MD   Hosp Day # 1 PCP Hina Comer MD     Feels well; sitting up and eating breakfast  No complains   Pain controlled      Current Medications:    Current Facility-Administered Medications:     acetaminophen (Tylenol) tab 650 mg, 650 mg, Oral, Q4H PRN **OR** HYDROcodone-acetaminophen (Norco) 5-325 MG per tab 1 tablet, 1 tablet, Oral, Q4H PRN **OR** HYDROcodone-acetaminophen (Norco) 5-325 MG per tab 2 tablet, 2 tablet, Oral, Q4H PRN    ondansetron (Zofran) 4 MG/2ML injection 4 mg, 4 mg, Intravenous, Q6H PRN    metoclopramide (Reglan) 5 mg/mL injection 5 mg, 5 mg, Intravenous, Q8H PRN    sodium chloride 0.9% infusion, , Intravenous, Continuous    morphINE PF 2 MG/ML injection 1 mg, 1 mg, Intravenous, Q2H PRN **OR** morphINE PF 2 MG/ML injection 2 mg, 2 mg, Intravenous, Q2H PRN **OR** morphINE PF 4 MG/ML injection 4 mg, 4 mg, Intravenous, Q2H PRN    niCARdipine in sodium chloride 0.86% (carDENE) 20 mg/200mL infusion premix, 5-15 mg/hr, Intravenous, Continuous PRN    aspirin DR tab 81 mg, 81 mg, Oral, Daily    clopidogrel (Plavix) tab 75 mg, 75 mg, Oral, Daily    famotidine (Pepcid) tab 20 mg, 20 mg, Oral, Nightly **OR** famotidine (Pepcid) 20 mg/2mL injection 20 mg, 20 mg, Intravenous, Nightly    allopurinol (Zyloprim) tab 100 mg, 100 mg, Oral, BID    atorvastatin (Lipitor) tab 40 mg, 40 mg, Oral, Nightly    gabapentin (Neurontin) cap 200 mg, 200 mg, Oral, TID    ezetimibe (Zetia) tab 10 mg, 10 mg, Oral, Nightly    sodium bicarbonate tab 325 mg, 325 mg, Oral, Daily    traMADol (Ultram) tab 50 mg, 50 mg, Oral, BID PRN    glucose (Dex4) 15 GM/59ML oral liquid 15 g, 15 g, Oral, Q15 Min PRN **OR** glucose (Glutose) 40% oral gel 15 g, 15 g, Oral, Q15 Min PRN **OR** glucose-vitamin C (Dex-4) chewable tab 4 tablet, 4 tablet, Oral, Q15 Min PRN **OR** dextrose  50% injection 50 mL, 50 mL, Intravenous, Q15 Min PRN **OR** glucose (Dex4) 15 GM/59ML oral liquid 30 g, 30 g, Oral, Q15 Min PRN **OR** glucose (Glutose) 40% oral gel 30 g, 30 g, Oral, Q15 Min PRN **OR** glucose-vitamin C (Dex-4) chewable tab 8 tablet, 8 tablet, Oral, Q15 Min PRN    insulin aspart (NovoLOG) 100 Units/mL FlexPen 1-10 Units, 1-10 Units, Subcutaneous, TID AC and HS    metoprolol succinate ER (Toprol XL) 24 hr tab 25 mg, 25 mg, Oral, QPM    benzocaine-menthol (Cepacol) lozenge 1 lozenge, 1 lozenge, Oral, PRN  Home Medications:  No current outpatient medications on file.       Review of Systems:  See HPI; A total of 12 systems reviewed and otherwise unremarkable.    Physical Exam:  Vital signs: Blood pressure 140/64, pulse 69, temperature 99 °F (37.2 °C), temperature source Temporal, resp. rate 26, height 5' 9\" (1.753 m), weight 184 lb 15.5 oz (83.9 kg), SpO2 (!) 85%.  General: No acute distress. Alert and oriented x 3  HEENT: Moist mucous membranes. EOM-I. PERR  Neck: No lymphadenopathy.  No JVD. No carotid bruits  Respiratory: Clear to auscultation bilaterally.  No wheezes. No rhonchi  Cardiovascular: S1, S2.  Regular rate and rhythm.  No murmurs. Equal pulses   Abdomen: Soft, nontender, nondistended.  Positive bowel sounds. No rebound tenderness   Neurologic: No focal neurological deficits.   Groin site intact; no sig hematoma noted  Musculoskeletal: Full range of motion of all extremities.  No swelling noted.   Integument: No lesions. No erythema.  Psychiatric: Appropriate mood and affect.    Recent Labs     02/05/25  1206 02/06/25  0402   WBC 6.7 8.6   HGB 9.9* 7.9*   .6* 104.5*   .0 139.0*       Recent Labs     02/05/25  1206 02/06/25  0402    139   K 4.4 4.7    110   CO2 22.0 22.0   BUN 24* 26*   CREATSERUM 1.64* 1.81*   CA 8.2* 8.0*   MG  --  1.6       Recent Labs     02/06/25  0402   ALT <7*   AST 13   ALB 2.9*       Imaging:  Reviewed     Impression:  CKD - stg 3b; pt  w/ hx of solitary kidney (L); hx of retroperitoneal fibrosis - noted L hydro (previously evaluated by urology)  Baseline Cr ~ 2  He is s/p AAA repair w/ usage of ~ 120 cc contrast; additionally, a small accessary L renal artery was sacrified w/ the stent repair   - monitor labs  - HLIV ; good PO intake   - have  risk of ELI reviewed w/ pt  HTN- cntrolled; monitor; BP parameters per Dr. Hatch  DM  PAD- s/p AAA repair POD #0  CAD  BMD/Acid- base; on oral bicarb- continue       Thank you for allowing me to participate in this patient's care.  Please feel free to call me with any questions or concerns.    Jason Brewster MD  2/6/2025

## 2025-02-06 NOTE — PLAN OF CARE
Assumed care of patient this morning at shift change.  Pt alert/oriented x4, denies pain, denies SOB/chest pain.  Lam groin sites dry/intact, soft, no hematoma, no bleeding noted.  Pedal Pulses +2 bilaterally, neurovascular checks normal.  Tolerating Cardiac/ADA diet, good appetite.  Ambulated multiple laps in hallway with PT and own walker from home, tolerated well.  Up in chair for meals.  Per Dr. Hatch:  okay to transfer to CTU with okay with other MDs.  Transfer orders in place.  DIL at bedside and participatory in patient care and aware of plan of care.

## 2025-02-06 NOTE — PROGRESS NOTES
Seen with daughter. No complaints. Wounds intact. Neuro- moving all 4 extremities. Pulses palpable Renal function- Creatinine 1.8/ Great urine output- keep one more day- Instructed wound care/ activity- he may need cervical spine surgery in future.

## 2025-02-06 NOTE — PHYSICAL THERAPY NOTE
PHYSICAL THERAPY EVALUATION - INPATIENT     Room Number: 452/452-A  Evaluation Date: 2/6/2025  Type of Evaluation: Initial  Physician Order: PT Eval and Treat    Presenting Problem: 2/5 AAA repair  Co-Morbidities : CKD, CABG, CAD, HTN, HL  Reason for Therapy: Mobility Dysfunction and Discharge Planning    PHYSICAL THERAPY ASSESSMENT   Patient is a 79 year old male admitted 2/5/2025 for AAA repair.  Prior to admission, patient's baseline is mod I for gait w/ rollator, independent w/ majority of adl's, does have some assist w/ lower body dressing.  Patient is currently functioning near baseline with bed mobility, transfers, and gait.  Patient is requiring supervision as a result of the following impairments: decreased functional strength, impaired standing balance, and decreased muscular endurance.  Physical Therapy will continue to follow for duration of hospitalization.    Patient will benefit from continued skilled PT Services for duration of hospitalization, however, given the patient is functioning near baseline level do not anticipate skilled therapy needs at discharge .    PLAN DURING HOSPITALIZATION  Nursing Mobility Recommendation : 1 Assist  PT Device Recommendation: Rollator (Pt owns his rollator)  PT Treatment Plan: Bed mobility;Patient education;Family education;Gait training;Stoop training;Transfer training;Balance training  Rehab Potential : Good  Frequency (Obs): 3x/week     CURRENT GOALS    Goal #1 Patient is able to demonstrate supine - sit EOB @ level: modified independent     Goal #2 Patient is able to demonstrate transfers EOB to/from BSC at assistance level: modified independent     Goal #3 Patient is able to ambulate 500 feet with assist device: rollator at assistance level: supervision     Goal #4 Up and down 2 stoop steps w/ use of rollator and CGA   Goal #5    Goal #6    Goal Comments: Goals established on 2/6/2025      PHYSICAL THERAPY MEDICAL/SOCIAL HISTORY  History related to current  admission: Patient is a 79 year old male admitted on 2025 from home  for planned AAA repair.  Pt w/ recent cervical issues that are being worked up, resulting in weakness R le > L.    HOME SITUATION  Type of Home: House  Home Layout: Multi-level;Able to live on main level  Stairs to Enter : 2 (Stoop type steps)   Railing: No    Stairs to Bedroom: 0         Lives With: Spouse    Drives: No   Patient Regularly Uses: Hearing aides;Rollator      Prior Level of [place]: Pt lives w/ his wife in a home w/ 2 stoop steps to enter.  Family supervises him up/down the steps.  Has supportive dil and son in the area.  Wife is able to assist w/ dressing if needed.    SUBJECTIVE  \"It helps my R knee to walk.  It feels good.\"    OBJECTIVE  Precautions: Bed/chair alarm  Fall Risk: High fall risk    WEIGHT BEARING RESTRICTION     PAIN ASSESSMENT  Ratin          COGNITION  Overall Cognitive Status:  WFL - within functional limits  Mildly forgetful at times, looking to his dil for reassurance that his answers are right    RANGE OF MOTION AND STRENGTH ASSESSMENT  Upper extremity ROM, limited at b shoulders L>R.  WFL strength for , biceps and triceps    Lower extremity ROM is within functional limits     Lower extremity strength is within functional limits except for the following:    Right Hip flexion  3+/5  Left Hip flexion  4/5  Right Knee extension  3+/5  Left Knee extension  4/5  Right Dorsiflexion  4-/5  Left Dorsiflexion  4/5    BALANCE  Static Sitting: Fair +  Dynamic Sitting: Fair  Static Standing: Fair -  Dynamic Standing: Fair -    ADDITIONAL TESTS                                    ACTIVITY TOLERANCE  Pulse: (!) 121 (With gait)  Heart Rate Source: Monitor  Resp: 15  BP: 158/90  BP Location: Left arm  BP Method: Automatic  Patient Position: Sitting    O2 WALK  Oxygen Therapy  SPO2% on Room Air at Rest: 95  SPO2% Ambulation on Room Air: 91    NEUROLOGICAL FINDINGS                        AM-PAC '6-Clicks'  INPATIENT SHORT FORM - BASIC MOBILITY  How much difficulty does the patient currently have...  Patient Difficulty: Turning over in bed (including adjusting bedclothes, sheets and blankets)?: None   Patient Difficulty: Sitting down on and standing up from a chair with arms (e.g., wheelchair, bedside commode, etc.): None   Patient Difficulty: Moving from lying on back to sitting on the side of the bed?: None   How much help from another person does the patient currently need...   Help from Another: Moving to and from a bed to a chair (including a wheelchair)?: A Little   Help from Another: Need to walk in hospital room?: A Little   Help from Another: Climbing 3-5 steps with a railing?: A Little     AM-PAC Score:  Raw Score: 21   Approx Degree of Impairment: 28.97%   Standardized Score (AM-PAC Scale): 50.25   CMS Modifier (G-Code): CJ    FUNCTIONAL ABILITY STATUS  Gait Assessment   Functional Mobility/Gait Assessment  Gait Assistance: Supervision  Distance (ft): 500  Assistive Device: Other (Comment) (Pt's own rollator)  Pattern: R Foot flat;L Foot flat (Mild kyphosis)    Skilled Therapy Provided     Bed Mobility:  Rolling: Right w/ hob nearly flat- mod I  Supine to sit: Mod I, hob nearly flat   Sit to supine: NT     Transfer Mobility:  Sit to stand: Mod I, cues for proper hand placement, but pt is used to pulling up w/ his rollator while the brakes are on.   Stand to sit: Mod I w/ cues for safety technique.  Gait = Pt completed gait 500'x1 w/ his own rollator and supervision assist.  C/o of fatigue at end of gait, but was very eager to walk a prolonged time to assist w/ his R knee pain.    Therapist's Comments: Patient and DIL educated on pacing gait and activities following surgery, lifting restrictions and performing ankle pumps at regular intervals during the day.  Pt and DIL express understanding.      Exercise/Education Provided:  Bed mobility  Functional activity tolerated  Gait training  Transfer  training    Patient End of Session: Up in chair;Needs met;Call light within reach;RN aware of session/findings;All patient questions and concerns addressed;Alarm set;Family present (Wm Arizmendi aware of eval session)      Patient Evaluation Complexity Level:  History High - 3 or more personal factors and/or co-morbidities   Examination of body systems Moderate - addressing a total of 3 or more elements   Clinical Presentation Low- Stable   Clinical Decision Making Low Complexity       PT Session Time: 30 minutes  Gait Trainin minutes  Therapeutic Activity: 7 minutes  Neuromuscular Re-education: 0 minutes  Therapeutic Exercise: 1 minutes

## 2025-02-06 NOTE — OPERATIVE REPORT
[facility]    PATIENT'S NAME: TIFFANIE SIERRA   ATTENDING PHYSICIAN: Eduin Hatch M.D.   OPERATING PHYSICIAN: Eduin Hatch M.D.   PATIENT ACCOUNT#:   [id number]    LOCATION:  [address] [place] [address]  MEDICAL RECORD #:   [id number]       YOB: 1945  ADMISSION DATE:       02/05/2025      OPERATION DATE:  02/05/2025    OPERATIVE REPORT    PREOPERATIVE DIAGNOSIS:  Enlarging abdominal aortic aneurysm about 5.3 to 5.4 cm with slight ectasia of both iliac arteries.    POSTOPERATIVE DIAGNOSIS:  Enlarging abdominal aortic aneurysm about 5.3 to 5.4 cm with slight ectasia of both iliac arteries.    PROCEDURE:    1.   Percutaneous duplex ultrasound access of the right and left common femoral arteries with micropuncture technique and placement of 2 Perclose closure devices on both the right and left side.  2.   Aortic angiogram with suprarenal pigtail catheter and imaging of abdominal aortic aneurysm.  3.   Placement of a [place] Excluder stent graft, main body right through a 16-[place] DrySeal sheath of 28.5 mm x 14.5 mm x 12 cm, with a contralateral stent graft/limb of 16 mm x 16 mm x 14 cm through a 12-[place] DrySeal sheath, with a right limb extension of 16 mm x 20 mm x 10 cm.   Closure of right femoral artery with two perclose/ 8Fr Angioseal/ left femorasl closure 2 Perclose    INDICATIONS:  This is an 79-year-old white male with enlarging abdominal aortic aneurysm, referred by Dr. Kevin Del Toro/Dr. Geo Najera.  He had a 5.4 cm aneurysm.  It was increased from 2019 when it was 4.5 cm and a little bit over 5 cm last summer.  The patient was recommended for a stent graft repair with the risks and complications including death, myocardial infarction, bleeding, infection, limb loss, graft thrombosis, future limb loss, future graft thrombosis, renal failure with dialysis, colon ischemia, and multisystem organ failure.  Risks and complications of not doing the procedure were also  discussed.  The patient has only 1 functioning kidney, and there was a small accessory, less than 1 to 2 mm, artery coming off the aneurysm going to the left renal artery pole distally, which would be sacrificed for the procedure.  Discussed medical management alone and just watching versus open repair versus stent graft repair.  The patient and family members wished to have the procedure done.  All questions answered.    OPERATIVE TECHNIQUE:  The patient was placed supine on procedure table, underwent general anesthesia, received preoperative antibiotics.  A Kirkpatrick catheter was placed by Surgery, arterial lines and IV by Anesthesia.  The patient had preoperative antibiotics.  [place] Vi-Drape was used to cover the operative field.  Both groins were prepped and draped in the usual sterile technique.  SCDs on both lower legs.      After a time-out was done, duplex ultrasound was used to access both the right and left common femoral arteries with micropuncture technique and eventually placement of a J-wire with the use of a Glidewire and a Kumpe catheter due to stenosis and tortuosity of both common iliac arteries at their origin.  The patient eventually had the Perclose closure devices x2 placed in both the right and left common femoral arteries.    The patient, over a Kumpe catheter with a J-wire had been placed in the aorta, had Amplatz Super Stiff 7 cm floppy-tip 108 cm placed in both the right and left side.  The patient then had the artery dilated with a 6-Spanish prior to placement of Perclose closure devices, then with an 8-Spanish sheath, and the patient was fully anticoagulated at this time.    After a greater than 5-minute period of time and ACTs had to be monitored, he got initially 11,000, then he got an additional 4000, and then finally 2000 for the ACT to finally come up.  He had the main body right placed through the 16-[place] DrySeal sheath placed up on the right side after angiogram was performed  showing where the infrarenal aorta and the left renal artery were.  It was at least a 3.5 cm neck.  The device was brought up with proper craniocaudal view of at least 25 to 30 degrees and deployed below the left renal artery.    The contralateral limb was cannulated with the use of a J-wire that was kept in for a dc wire into the abdominal aortic aneurysm, and then a Kumpe catheter was brought up, and that readily cannulated the contralateral limb.  The Kumpe catheter was advanced, and eventually a J-wire was placed with eventually a pigtail catheter placed, rotated within to make sure it was in, and a small puff of contrast was done under fluoroscopy to confirm within the graft.    The Amplatz Super Stiff guide was then placed up and the pigtail catheter kept in position, and through a retrograde injection from the left groin, the left iliac bifurcation was identified.  The patient then had the contralateral limb placed of 16 mm x 16 mm x 14 cm right to the iliac bifurcation and just 0.5 mm above the flow divider.    The patient then had the ipsilateral limb main portion deployed and the limb was then cannulated with a retrograde angiogram from the [place] DrySeal sheath showing the iliac bifurcation, and then through this was placed an ipsilateral limb extension of 16 x 20 x 10 cm.  It ended right at the iliac bifurcation.    MOB balloons were placed on both the right and left side, and the graft was splayed up against the aorta just above the renal artery, below the renal artery, and just at the area where there was a knuckle of aorta going into the aneurysm.  Care was taken not to overdistend this area with concern of rupture.    This was done down the length of both iliac arteries over the overlapped areas and distally, especially where there was a tight stenosis of the iliac arteries from the initial procedure.    A pigtail catheter placed up again.  An angiogram was performed showing perfusion still of the  left renal artery with the left kidney open and seemed to be about the same as before, both internal iliac arteries open, and there was no evidence for any type 1, type 2, type 3, or type 4 endoleak.    The patient then had J wires placed on both groins, and the sheath was withdrawn, with hemostasis obtained on the right side with the 2 Perclose devices but put an additional 8-[place] Angio-Seal for hemostasis on the left side with the 2 Perclose devices.  The patient received protamine at the end of the procedure.  The vital signs remained stable.  Estimated blood loss maybe 150 to 200 mL, mostly from the closure of the right side.  The patient received 120 mL of contrast.  Fluoro time was 24.6 minutes.  DAP was 168,149.  The patient received 1.5 L of crystalloid.  Good pulses in the feet at the end of the procedure.  The wounds were clean and dry at the end of the procedure.      FINAL DIAGNOSIS:  Enlarging abdominal aortic aneurysm treated with endovascular stent graft repair as above.    Dictated By Eduin Hatch M.D.  d: 02/05/2025 11:41:45  t: 02/05/2025 15:45:34  [facility] [id number]  JJW/    cc: Dr. Kevin Najera

## 2025-02-06 NOTE — PROGRESS NOTES
Cardiology Progress Note      Jeff Morales Patient Status:  Inpatient    1945 MRN [id number]   Location [facility] 4SW-A Attending Eduin Hatch MD   Hosp Day # 1 PCP Hina Comer MD     Reason for consultation:  Post op    Impression:  Dr. Hatch: s/p stent graft repair of AAA  CABG  DMII  ICD  HLD  CKD III    Plan:  Post op: doing well and hemodynamically stable  Continue DAPT  Restarted on BB now off IV NTG.   Goal BP per vascular is 110-160 mmHg.  Plan to resume nifedepine dosing at   Tele: HR elevation with walking, NSR up to 120's. Resolves with rest. Monitor for now. Asymptomatic with elevated HR  Echo done in  with normal LV function and mild valvular disease.   Consider follow up echo as OP given KELSI  on auscultation to monitor Aortic stenosis.   6. Plan for transfer from ICU       History of Present Illness:  Jeff Morales is a 79 year old male who presented to [facility] on 2025.    This is a patient of my partner: Dr. Villeda .    The patient has a complex past medical history as above who presents for AAA repair with Dr. Arnold.  Currently doing well in the ICU without any issues.  He had an uncomplicated surgery..    Cardiology consultation was requested.    Medications:  Current Facility-Administered Medications   Medication Dose Route Frequency    acetaminophen (Tylenol) tab 650 mg  650 mg Oral Q4H PRN    Or    HYDROcodone-acetaminophen (Norco) 5-325 MG per tab 1 tablet  1 tablet Oral Q4H PRN    Or    HYDROcodone-acetaminophen (Norco) 5-325 MG per tab 2 tablet  2 tablet Oral Q4H PRN    ondansetron (Zofran) 4 MG/2ML injection 4 mg  4 mg Intravenous Q6H PRN    metoclopramide (Reglan) 5 mg/mL injection 5 mg  5 mg Intravenous Q8H PRN    morphINE PF 2 MG/ML injection 1 mg  1 mg Intravenous Q2H PRN    Or    morphINE PF 2 MG/ML injection 2 mg  2 mg Intravenous Q2H PRN    Or    morphINE PF 4 MG/ML injection 4 mg  4 mg Intravenous Q2H PRN    aspirin DR tab 81 mg  81 mg  Oral Daily    clopidogrel (Plavix) tab 75 mg  75 mg Oral Daily    famotidine (Pepcid) tab 20 mg  20 mg Oral Nightly    Or    famotidine (Pepcid) 20 mg/2mL injection 20 mg  20 mg Intravenous Nightly    allopurinol (Zyloprim) tab 100 mg  100 mg Oral BID    atorvastatin (Lipitor) tab 40 mg  40 mg Oral Nightly    gabapentin (Neurontin) cap 200 mg  200 mg Oral TID    ezetimibe (Zetia) tab 10 mg  10 mg Oral Nightly    sodium bicarbonate tab 325 mg  325 mg Oral Daily    traMADol (Ultram) tab 50 mg  50 mg Oral BID PRN    glucose (Dex4) 15 GM/59ML oral liquid 15 g  15 g Oral Q15 Min PRN    Or    glucose (Glutose) 40% oral gel 15 g  15 g Oral Q15 Min PRN    Or    glucose-vitamin C (Dex-4) chewable tab 4 tablet  4 tablet Oral Q15 Min PRN    Or    dextrose 50% injection 50 mL  50 mL Intravenous Q15 Min PRN    Or    glucose (Dex4) 15 GM/59ML oral liquid 30 g  30 g Oral Q15 Min PRN    Or    glucose (Glutose) 40% oral gel 30 g  30 g Oral Q15 Min PRN    Or    glucose-vitamin C (Dex-4) chewable tab 8 tablet  8 tablet Oral Q15 Min PRN    insulin aspart (NovoLOG) 100 Units/mL FlexPen 1-10 Units  1-10 Units Subcutaneous TID AC and HS    metoprolol succinate ER (Toprol XL) 24 hr tab 25 mg  25 mg Oral QPM    benzocaine-menthol (Cepacol) lozenge 1 lozenge  1 lozenge Oral PRN       Past Medical History:    Abdominal aneurysm    Aortic insufficiency    Atherosclerosis of coronary artery    Diabetes (HCC)    High blood pressure    High cholesterol    Mitral regurgitation    Neuropathy    Peripheral vascular disease    PVD (peripheral vascular disease)    Tricuspid insufficiency    Unstable angina (HCC)       Past Surgical History:   Procedure Laterality Date    Angiogram      left heart catheterization january 2004    Back surgery      Bypass surgery  01/2004    CABG SVG to Diag and Right PDA, Lima to LAD,    Nasal surg proc unlisted      Nasal surgery         Family History  There is no family history of sudden cardiac death.    Social  History   reports that he has quit smoking. His smoking use included cigarettes. He has never used smokeless tobacco. He reports current alcohol use. He reports that he does not use drugs.     Allergies  Allergies[1]      Review of Systems:  Constitutional: negative for fevers  Eyes: negative for visual disturbance  Ears, nose, mouth, throat, and face: negative for epistaxis  Respiratory: negative for dyspnea on exertion  Cardiovascular: negative for chest pain  Gastrointestinal: negative for melena  Genitourinary:negative for hematuria  Hematologic/lymphatic: negative for bleeding  Musculoskeletal:negative for myalgias  Neurological: negative for dizziness and headaches  Endocrine: negative for temperature intolerance      Physical Exam:  Blood pressure 139/68, pulse 60, temperature 98.8 °F (37.1 °C), temperature source Temporal, resp. rate 13, height 5' 9\" (1.753 m), weight 184 lb 15.5 oz (83.9 kg), SpO2 97%.  Temp (24hrs), Av.4 °F (36.9 °C), Min:97.1 °F (36.2 °C), Max:99 °F (37.2 °C)    Wt Readings from Last 3 Encounters:   25 184 lb 15.5 oz (83.9 kg)   22 187 lb (84.8 kg)   21 184 lb (83.5 kg)       General: Awake and alert; in no acute distress  HEENT: Extraocular movements are intact; sclerae are anicteric; scalp is atrauamatic; no thyromegaly  Neck: Supple; no JVD; no carotid bruits  Cardiac: Regular rate and regular rhythm; no rubs/gallops are appreciated; PMI is non-displaced; there is no evidence of a sternal heave. 2/6 KELSI.   Lungs: Clear to auscultation bilaterally; no accessory muscle use is noted  Abdomen: Soft, non-tender; bowel sounds are normoactive; no hepatosplenomegaly  Extremities: No clubbing or cyanosis; moves all 4 extremities normally  Psychiatric: Normal mood and affect; answers questions appropriately  Dermatologic: No rashes; normal skin turgor    Diagnostic testing:    EKG: Normal sinus rhythm    Labs:   Lab Results   Component Value Date    INR 1.07 2025         Lab Results   Component Value Date    WBC 8.6 02/06/2025    HGB 7.9 02/06/2025    HCT 23.1 02/06/2025    .0 02/06/2025    CREATSERUM 1.81 02/06/2025    BUN 26 02/06/2025     02/06/2025    K 4.7 02/06/2025     02/06/2025    CO2 22.0 02/06/2025    GLU 86 02/06/2025    CA 8.0 02/06/2025    ALB 2.9 02/06/2025    ALKPHO 57 02/06/2025    BILT 0.3 02/06/2025    TP 5.0 02/06/2025    AST 13 02/06/2025    ALT <7 02/06/2025    PTT 27.0 02/06/2025    MG 1.6 02/06/2025    PGLU 114 02/06/2025         Thank you for allowing our practice to participate in the care of your patient. Please do not hesitate to contact me if you have any questions.    LUCIO Hutchison, 02/06/25, 4:31 PM           [1] No Known Allergies

## 2025-02-06 NOTE — PROGRESS NOTES
[facility]   part of [facility]     Hospitalist Progress Note     Jeff Morales Patient Status:  Inpatient    1945 MRN [id number]   [facility] 4SW-A Attending Eduin Hatch MD   Hosp Day # 1 PCP Hina Comer MD     Chief Complaint: Medical management    Subjective:     Patient feels better.  Working with physical therapy now.  Seen with patient's family at bedside.  Denies any abdominal pain.  Minimal pain to no pain at the incision site according to patient.  Denies any chest pain or shortness of breath.  No nausea vomiting.    Objective:    Review of Systems:   A comprehensive review of systems was completed; pertinent positive and negatives stated in subjective.    Vital signs:  Temp:  [98.2 °F (36.8 °C)-99 °F (37.2 °C)] 98.8 °F (37.1 °C)  Pulse:  [] 60  Resp:  [10-26] 13  BP: (116-166)/(54-92) 139/68  SpO2:  [85 %-98 %] 97 %  AO: (119-175)/(53-83) 131/58    Physical Exam:    /68   Pulse 60   Temp 98.8 °F (37.1 °C) (Temporal)   Resp 13   Ht 5' 9\" (1.753 m)   Wt 184 lb 15.5 oz (83.9 kg)   SpO2 97%   BMI 27.31 kg/m²     General: No acute distress.   HEENT: Moist mucous membranes.   Respiratory: Clear to auscultation bilaterally.  No wheezes. No rhonchi.  Cardiovascular: S1, S2.  Regular rate and rhythm.    Abdomen: Soft, nontender, nondistended.  Positive bowel sounds.   Neurologic: Awake alert oriented x 3, no focal neurological deficits.  Musculoskeletal: Full range of motion of all extremities.  No pedal edema or calf tenderness  Integument: Bilateral groin access site with no ecchymosis or hematoma.    Peripheral pulses equally palpable  Psychiatric: Appropriate mood and affect.    Diagnostic Data:    Labs:  Recent Labs   Lab 25  0905 25  1206 25  0402   WBC 7.8 6.7 8.6   HGB 12.8* 9.9* 7.9*   .0* 103.6* 104.5*   .0 153.0 139.0*   INR 1.07  --   --        Recent Labs   Lab 25  0905 25  1206 25  0402   GLU  143* 132* 86   BUN 29* 24* 26*   CREATSERUM 1.91* 1.64* 1.81*   CA 9.4 8.2* 8.0*   ALB 4.4  --  2.9*    138 139   K 4.2 4.4 4.7    109 110   CO2 25.0 22.0 22.0   ALKPHO 80  --  57   AST 24  --  13   ALT 13  --  <7*   BILT 0.4  --  0.3   TP 7.5  --  5.0*       Estimated Glomerular Filtration Rate: 37.3 mL/min/1.73m2 (A) (by CKD-EPI based on SCr of 1.81 mg/dL (H)).    No results for input(s): \"TROP\", \"TROPHS\", \"CK\" in the last 168 hours.    Recent Labs   Lab 01/31/25  0905   PTP 14.0   INR 1.07          Medications:    NIFEdipine ER  30 mg Oral Daily    aspirin  81 mg Oral Daily    clopidogrel  75 mg Oral Daily    famotidine  20 mg Oral Nightly    Or    famotidine  20 mg Intravenous Nightly    allopurinol  100 mg Oral BID    atorvastatin  40 mg Oral Nightly    gabapentin  200 mg Oral TID    ezetimibe  10 mg Oral Nightly    sodium bicarbonate  325 mg Oral Daily    insulin aspart  1-10 Units Subcutaneous TID AC and HS    metoprolol succinate ER  25 mg Oral QPM       Assessment & Plan:      Status post endovascular stent graft repair of AAA by vascular surgery Dr. 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     Discussed with patient, family at bedside.  Discussed with RN.    Dr. Hewitt will follow from morning tomorrow    Andra Lowe MD    Supplementary Documentation:     Quality:  DVT Mechanical Prophylaxis:   SCDs,    DVT Pharmacologic Prophylaxis   Medication   None         DVT Pharmacologic prophylaxis: Aspirin 81 mg      Code Status: DNAR/[facility]  Kirkpatrick: No urinary catheter in place  Kirkpatrick Duration  (in days):   Central line:    KISHAN:     Discharge is dependent on: Clinical progress, per vascular surgeon  At this point Mr. Morales is expected to be discharge to: To be decided pending PT OT eval    The 21st Century Cures Act makes medical notes like these available to patients in the interest of transparency. Please be advised this is a medical document. Medical documents are intended to carry relevant information, facts as evident, and the clinical opinion of the practitioner. The medical note is intended as peer to peer communication and may appear blunt or direct. It is written in medical language and may contain abbreviations or verbiage that are unfamiliar.              **Certification      PHYSICIAN Certification of Need for Inpatient Hospitalization - Initial Certification    Patient will require inpatient services that will reasonably be expected to span two midnight's based on the clinical documentation in H+P.   Based on patients current state of illness, I anticipate that, after discharge, patient will require TBD.

## 2025-02-06 NOTE — PLAN OF CARE
Report received from previous RN.  Pt denies any pain.  Pt A&O x 4.  Lungs clear and on RA.  Cardiac monitor SR on Nitroglycerin gtt for a goal of SBP between 110/160. Tritating off per orders. Abd soft and non distended.  Lam groins with dressing D/I and soft to touch Pedal pulses palpable +3 warm to touch. Kirkpatrick with clear yellow urine. Call light within reach.  2300 Nitroglycerin gtt stopped

## 2025-02-07 ENCOUNTER — APPOINTMENT (OUTPATIENT)
Dept: CV DIAGNOSTICS | Facility: HOSPITAL | Age: 80
End: 2025-02-07
Attending: INTERNAL MEDICINE
Payer: MEDICARE

## 2025-02-07 VITALS
WEIGHT: 184.94 LBS | HEIGHT: 69 IN | SYSTOLIC BLOOD PRESSURE: 118 MMHG | TEMPERATURE: 98 F | RESPIRATION RATE: 17 BRPM | DIASTOLIC BLOOD PRESSURE: 76 MMHG | OXYGEN SATURATION: 99 % | HEART RATE: 64 BPM | BODY MASS INDEX: 27.39 KG/M2

## 2025-02-07 LAB
ALBUMIN SERPL-MCNC: 3.3 G/DL (ref 3.2–4.8)
ALBUMIN/GLOB SERPL: 1.6 {RATIO} (ref 1–2)
ALP LIVER SERPL-CCNC: 61 U/L
ALT SERPL-CCNC: <7 U/L
ANION GAP SERPL CALC-SCNC: 9 MMOL/L (ref 0–18)
AST SERPL-CCNC: 15 U/L (ref ?–34)
BASOPHILS # BLD AUTO: 0.04 X10(3) UL (ref 0–0.2)
BASOPHILS NFR BLD AUTO: 0.5 %
BILIRUB SERPL-MCNC: 0.4 MG/DL (ref 0.2–1.1)
BUN BLD-MCNC: 26 MG/DL (ref 9–23)
CALCIUM BLD-MCNC: 8.5 MG/DL (ref 8.7–10.6)
CHLORIDE SERPL-SCNC: 110 MMOL/L (ref 98–112)
CO2 SERPL-SCNC: 22 MMOL/L (ref 21–32)
CREAT BLD-MCNC: 1.84 MG/DL
EGFRCR SERPLBLD CKD-EPI 2021: 37 ML/MIN/1.73M2 (ref 60–?)
EOSINOPHIL # BLD AUTO: 0.44 X10(3) UL (ref 0–0.7)
EOSINOPHIL NFR BLD AUTO: 6 %
ERYTHROCYTE [DISTWIDTH] IN BLOOD BY AUTOMATED COUNT: 14.7 %
GLOBULIN PLAS-MCNC: 2.1 G/DL (ref 2–3.5)
GLUCOSE BLD-MCNC: 106 MG/DL (ref 70–99)
GLUCOSE BLD-MCNC: 131 MG/DL (ref 70–99)
GLUCOSE BLD-MCNC: 99 MG/DL (ref 70–99)
HCT VFR BLD AUTO: 23 %
HGB BLD-MCNC: 7.8 G/DL
IMM GRANULOCYTES # BLD AUTO: 0.04 X10(3) UL (ref 0–1)
IMM GRANULOCYTES NFR BLD: 0.5 %
LYMPHOCYTES # BLD AUTO: 1.09 X10(3) UL (ref 1–4)
LYMPHOCYTES NFR BLD AUTO: 14.9 %
MCH RBC QN AUTO: 35.3 PG (ref 26–34)
MCHC RBC AUTO-ENTMCNC: 33.9 G/DL (ref 31–37)
MCV RBC AUTO: 104.1 FL
MONOCYTES # BLD AUTO: 0.93 X10(3) UL (ref 0.1–1)
MONOCYTES NFR BLD AUTO: 12.7 %
NEUTROPHILS # BLD AUTO: 4.77 X10 (3) UL (ref 1.5–7.7)
NEUTROPHILS # BLD AUTO: 4.77 X10(3) UL (ref 1.5–7.7)
NEUTROPHILS NFR BLD AUTO: 65.4 %
OSMOLALITY SERPL CALC.SUM OF ELEC: 297 MOSM/KG (ref 275–295)
PLATELET # BLD AUTO: 119 10(3)UL (ref 150–450)
PLATELETS.RETICULATED NFR BLD AUTO: 6.3 % (ref 0–7)
POTASSIUM SERPL-SCNC: 4.3 MMOL/L (ref 3.5–5.1)
PROT SERPL-MCNC: 5.4 G/DL (ref 5.7–8.2)
RBC # BLD AUTO: 2.21 X10(6)UL
SODIUM SERPL-SCNC: 141 MMOL/L (ref 136–145)
WBC # BLD AUTO: 7.3 X10(3) UL (ref 4–11)

## 2025-02-07 PROCEDURE — 99232 SBSQ HOSP IP/OBS MODERATE 35: CPT | Performed by: HOSPITALIST

## 2025-02-07 PROCEDURE — 93306 TTE W/DOPPLER COMPLETE: CPT | Performed by: INTERNAL MEDICINE

## 2025-02-07 RX ORDER — ASPIRIN 81 MG/1
81 TABLET ORAL DAILY
Qty: 30 TABLET | Refills: 11 | Status: SHIPPED | OUTPATIENT
Start: 2025-02-07

## 2025-02-07 RX ORDER — HYDRALAZINE HYDROCHLORIDE 20 MG/ML
10 INJECTION INTRAMUSCULAR; INTRAVENOUS EVERY 2 HOUR PRN
Status: DISCONTINUED | OUTPATIENT
Start: 2025-02-07 | End: 2025-02-07

## 2025-02-07 RX ORDER — ASPIRIN 81 MG/1
81 TABLET ORAL DAILY
Qty: 30 TABLET | Refills: 11 | Status: SHIPPED | OUTPATIENT
Start: 2025-02-07 | End: 2025-02-07

## 2025-02-07 NOTE — PROGRESS NOTES
[facility]  Progress Note    Jeff Morales Patient Status:  Inpatient    1945 MRN [id number]   [facility] 4SW-A Attending Eduin Hatch MD   Hosp Day # 2 PCP Hina Comer MD       No acute events  Feels well      Current Medications:    Current Facility-Administered Medications:     hydrALAzine (Apresoline) 20 mg/mL injection 10 mg, 10 mg, Intravenous, Q2H PRN    NIFEdipine ER (Procardia-XL) 24 hr tab 30 mg, 30 mg, Oral, Daily    acetaminophen (Tylenol) tab 650 mg, 650 mg, Oral, Q4H PRN **OR** HYDROcodone-acetaminophen (Norco) 5-325 MG per tab 1 tablet, 1 tablet, Oral, Q4H PRN **OR** HYDROcodone-acetaminophen (Norco) 5-325 MG per tab 2 tablet, 2 tablet, Oral, Q4H PRN    ondansetron (Zofran) 4 MG/2ML injection 4 mg, 4 mg, Intravenous, Q6H PRN    metoclopramide (Reglan) 5 mg/mL injection 5 mg, 5 mg, Intravenous, Q8H PRN    morphINE PF 2 MG/ML injection 1 mg, 1 mg, Intravenous, Q2H PRN **OR** morphINE PF 2 MG/ML injection 2 mg, 2 mg, Intravenous, Q2H PRN **OR** morphINE PF 4 MG/ML injection 4 mg, 4 mg, Intravenous, Q2H PRN    aspirin DR tab 81 mg, 81 mg, Oral, Daily    famotidine (Pepcid) tab 20 mg, 20 mg, Oral, Nightly **OR** famotidine (Pepcid) 20 mg/2mL injection 20 mg, 20 mg, Intravenous, Nightly    allopurinol (Zyloprim) tab 100 mg, 100 mg, Oral, BID    atorvastatin (Lipitor) tab 40 mg, 40 mg, Oral, Nightly    gabapentin (Neurontin) cap 200 mg, 200 mg, Oral, TID    ezetimibe (Zetia) tab 10 mg, 10 mg, Oral, Nightly    sodium bicarbonate tab 325 mg, 325 mg, Oral, Daily    traMADol (Ultram) tab 50 mg, 50 mg, Oral, BID PRN    glucose (Dex4) 15 GM/59ML oral liquid 15 g, 15 g, Oral, Q15 Min PRN **OR** glucose (Glutose) 40% oral gel 15 g, 15 g, Oral, Q15 Min PRN **OR** glucose-vitamin C (Dex-4) chewable tab 4 tablet, 4 tablet, Oral, Q15 Min PRN **OR** dextrose 50% injection 50 mL, 50 mL, Intravenous, Q15 Min PRN **OR** glucose (Dex4) 15 GM/59ML oral liquid 30 g, 30 g, Oral, Q15 Min PRN  **OR** glucose (Glutose) 40% oral gel 30 g, 30 g, Oral, Q15 Min PRN **OR** glucose-vitamin C (Dex-4) chewable tab 8 tablet, 8 tablet, Oral, Q15 Min PRN    insulin aspart (NovoLOG) 100 Units/mL FlexPen 1-10 Units, 1-10 Units, Subcutaneous, TID AC and HS    metoprolol succinate ER (Toprol XL) 24 hr tab 25 mg, 25 mg, Oral, QPM    benzocaine-menthol (Cepacol) lozenge 1 lozenge, 1 lozenge, Oral, PRN  Home Medications:  No current outpatient medications on file.       Review of Systems:  See HPI; A total of 12 systems reviewed and otherwise unremarkable.    Physical Exam:  Vital signs: Blood pressure 115/66, pulse 86, temperature 98.1 °F (36.7 °C), temperature source Oral, resp. rate 18, height 5' 9\" (1.753 m), weight 184 lb 15.5 oz (83.9 kg), SpO2 98%.  General: No acute distress. Alert and oriented x 3  HEENT: Moist mucous membranes. EOM-I. PERR  Neck: No lymphadenopathy.  No JVD. No carotid bruits  Respiratory: Clear to auscultation bilaterally.  No wheezes. No rhonchi  Cardiovascular: S1, S2.  Regular rate and rhythm.  No murmurs. Equal pulses   Abdomen: Soft, nontender, nondistended.  Positive bowel sounds. No rebound tenderness   Neurologic: No focal neurological deficits.   Groin site intact; no sig hematoma noted  Musculoskeletal: Full range of motion of all extremities.  No swelling noted.   Integument: No lesions. No erythema.  Psychiatric: Appropriate mood and affect.  Recent Labs     02/05/25  1206 02/06/25  0402 02/07/25  0507   WBC 6.7 8.6 7.3   HGB 9.9* 7.9* 7.8*   .6* 104.5* 104.1*   .0 139.0* 119.0*       Recent Labs     02/05/25  1206 02/06/25  0402 02/07/25  0507    139 141   K 4.4 4.7 4.3    110 110   CO2 22.0 22.0 22.0   BUN 24* 26* 26*   CREATSERUM 1.64* 1.81* 1.84*   CA 8.2* 8.0* 8.5*   MG  --  1.6  --        Recent Labs     02/06/25  0402 02/07/25  0507   ALT <7* <7*   AST 13 15   ALB 2.9* 3.3         Imaging:  Reviewed     Impression:  CKD - stg 3b; pt w/ hx of solitary  kidney (L); hx of retroperitoneal fibrosis - noted L hydro (previously evaluated by urology)  Baseline Cr ~ 2  He is s/p AAA repair w/ usage of ~ 120 cc contrast; additionally, a small accessary L renal artery was sacrified w/ the stent repair   - monitor labs- stable   HTN- cntrolled; monitor; BP parameters per Dr. Holden THAO  PAD- s/p AAA repair   CAD  BMD/Acid- base; on oral bicarb- continue     DC planning per vascular surgery; f/u with o/p nephrology post discharge- discussed w/ pt and family       Thank you for allowing me to participate in this patient's care.  Please feel free to call me with any questions or concerns.    Jason Brewster MD  2/7/2025

## 2025-02-07 NOTE — PLAN OF CARE
Report received from previous RN.  Pt A&O x 4.  Pt c/o right foot pain and medicated as per orders.  Up to the bathroom with walker with minimal assist.  Lungs Clear.  1l NC at bed time only . Cardiac monitor SR with stable BP.  Eating well and voiding to the bathroom.  IV fluids hep lock. Updates given.  Pt transferring to room 8635  report given to Diane LEWIS.  Plan to go home tomorrow.

## 2025-02-07 NOTE — CM/SW NOTE
CM met w/ pt and his son Hernandez to review POLST. Pt/ son agree they would like to complete before DC. Pt confirmed his wishes to be DNAR/ [facility]. He and his son Hernandez reviewed POLST and pt signed.    MD contacted to sign POLST form with rounds.     D/w pt that CM will provide copy of POLST once signed by MD.    Addendum 1200: Signed POLST provided to pt/pt's son and copy on pt's chart.    SVETLANA Sotelo, CMSRN    [id number]

## 2025-02-07 NOTE — PLAN OF CARE
Acquired patient around 0730. Alert and oriented x4. Bilateral groin incisions, bruised. On Ra. SpO2 within normal limits. SR w RBB on tele. Murmur. Plan for echo today. Poss. dc today. Continent of bowel and bladder. Pt co rt foot pain. Chronic. PRN pain medication given. [facility] ANP notified that plavix stopped by Vascular Surgery, Cardiology ok with dc'd.  Call light within reach. Continue plan of care.     1521: Confirmed with [facility] APN that echo cleared. Ok to discharge.       Problem: Diabetes/Glucose Control  Goal: Glucose maintained within prescribed range  Description: INTERVENTIONS:  - Monitor Blood Glucose as ordered  - Assess for signs and symptoms of hyperglycemia and hypoglycemia  - Administer ordered medications to maintain glucose within target range  - Assess barriers to adequate nutritional intake and initiate nutrition consult as needed  - Instruct patient on self management of diabetes  Outcome: Progressing     Problem: Diabetes/Glucose Control  Goal: Glucose maintained within prescribed range  Description: INTERVENTIONS:  - Monitor Blood Glucose as ordered  - Assess for signs and symptoms of hyperglycemia and hypoglycemia  - Administer ordered medications to maintain glucose within target range  - Assess barriers to adequate nutritional intake and initiate nutrition consult as needed  - Instruct patient on self management of diabetes  Outcome: Progressing     Problem: CARDIOVASCULAR - ADULT  Goal: Maintains optimal cardiac output and hemodynamic stability  Description: INTERVENTIONS:  - Monitor vital signs, rhythm, and trends  - Monitor for bleeding, hypotension and signs of decreased cardiac output  - Evaluate effectiveness of vasoactive medications to optimize hemodynamic stability  - Monitor arterial and/or venous puncture sites for bleeding and/or hematoma  - Assess quality of pulses, skin color and temperature  - Assess for signs of decreased coronary artery perfusion - ex.  Angina  - Evaluate fluid balance, assess for edema, trend weights  Outcome: Progressing  Goal: Absence of cardiac arrhythmias or at baseline  Description: INTERVENTIONS:  - Continuous cardiac monitoring, monitor vital signs, obtain 12 lead EKG if indicated  - Evaluate effectiveness of antiarrhythmic and heart rate control medications as ordered  - Initiate emergency measures for life threatening arrhythmias  - Monitor electrolytes and administer replacement therapy as ordered  Outcome: Progressing     Problem: GASTROINTESTINAL - ADULT  Goal: Minimal or absence of nausea and vomiting  Description: INTERVENTIONS:  - Maintain adequate hydration with IV or PO as ordered and tolerated  - Nasogastric tube to low intermittent suction as ordered  - Evaluate effectiveness of ordered antiemetic medications  - Provide nonpharmacologic comfort measures as appropriate  - Advance diet as tolerated, if ordered  - Obtain nutritional consult as needed  - Evaluate fluid balance  Outcome: Progressing  Goal: Maintains or returns to baseline bowel function  Description: INTERVENTIONS:  - Assess bowel function  - Maintain adequate hydration with IV or PO as ordered and tolerated  - Evaluate effectiveness of GI medications  - Encourage mobilization and activity  - Obtain nutritional consult as needed  - Establish a toileting routine/schedule  - Consider collaborating with pharmacy to review patient's medication profile  Outcome: Progressing  Goal: Maintains adequate nutritional intake (undernourished)  Description: INTERVENTIONS:  - Monitor percentage of each meal consumed  - Identify factors contributing to decreased intake, treat as appropriate  - Assist with meals as needed  - Monitor I&O, WT and lab values  - Obtain nutritional consult as needed  - Optimize oral hygiene and moisture  - Encourage food from home; allow for food preferences  - Enhance eating environment  Outcome: Progressing     Problem: GENITOURINARY - ADULT  Goal:  Absence of urinary retention  Description: INTERVENTIONS:  - Assess patient’s ability to void and empty bladder  - Monitor intake/output and perform bladder scan as needed  - Follow urinary retention protocol/standard of care  - Consider collaborating with pharmacy to review patient's medication profile  - Implement strategies to promote bladder emptying  Outcome: Progressing     Problem: METABOLIC/FLUID AND ELECTROLYTES - ADULT  Goal: Glucose maintained within prescribed range  Description: INTERVENTIONS:  - Monitor Blood Glucose as ordered  - Assess for signs and symptoms of hyperglycemia and hypoglycemia  - Administer ordered medications to maintain glucose within target range  - Assess barriers to adequate nutritional intake and initiate nutrition consult as needed  - Instruct patient on self management of diabetes  Outcome: Progressing  Goal: Electrolytes maintained within normal limits  Description: INTERVENTIONS:  - Monitor labs and rhythm and assess patient for signs and symptoms of electrolyte imbalances  - Administer electrolyte replacement as ordered  - Monitor response to electrolyte replacements, including rhythm and repeat lab results as appropriate  - Fluid restriction as ordered  - Instruct patient on fluid and nutrition restrictions as appropriate  Outcome: Progressing  Goal: Hemodynamic stability and optimal renal function maintained  Description: INTERVENTIONS:  - Monitor labs and assess for signs and symptoms of volume excess or deficit  - Monitor intake, output and patient weight  - Monitor urine specific gravity, serum osmolarity and serum sodium as indicated or ordered  - Monitor response to interventions for patient's volume status, including labs, urine output, blood pressure (other measures as available)  - Encourage oral intake as appropriate  - Instruct patient on fluid and nutrition restrictions as appropriate  Outcome: Progressing     Problem: SKIN/TISSUE INTEGRITY - ADULT  Goal: Skin  integrity remains intact  Description: INTERVENTIONS  - Assess and document risk factors for pressure ulcer development  - Assess and document skin integrity  - Monitor for areas of redness and/or skin breakdown  - Initiate interventions, skin care algorithm/standards of care as needed  Outcome: Progressing  Goal: Incision(s), wounds(s) or drain site(s) healing without S/S of infection  Description: INTERVENTIONS:  - Assess and document risk factors for pressure ulcer development  - Assess and document skin integrity  - Assess and document dressing/incision, wound bed, drain sites and surrounding tissue  - Implement wound care per orders  - Initiate isolation precautions as appropriate  - Initiate Pressure Ulcer prevention bundle as indicated  Outcome: Progressing     Problem: HEMATOLOGIC - ADULT  Goal: Maintains hematologic stability  Description: INTERVENTIONS  - Assess for signs and symptoms of bleeding or hemorrhage  - Monitor labs and vital signs for trends  - Administer supportive blood products/factors, fluids and medications as ordered and appropriate  - Administer supportive blood products/factors as ordered and appropriate  Outcome: Progressing  Goal: Free from bleeding injury  Description: (Example usage: patient with low platelets)  INTERVENTIONS:  - Avoid intramuscular injections, enemas and rectal medication administration  - Ensure safe mobilization of patient  - Hold pressure on venipuncture sites to achieve adequate hemostasis  - Assess for signs and symptoms of internal bleeding  - Monitor lab trends  - Patient is to report abnormal signs of bleeding to staff  - Avoid use of toothpicks and dental floss  - Use electric shaver for shaving  - Use soft bristle tooth brush  - Limit straining and forceful nose blowing  Outcome: Progressing     Problem: MUSCULOSKELETAL - ADULT  Goal: Return mobility to safest level of function  Description: INTERVENTIONS:  - Assess patient stability and activity tolerance  for standing, transferring and ambulating w/ or w/o assistive devices  - Assist with transfers and ambulation using safe patient handling equipment as needed  - Ensure adequate protection for wounds/incisions during mobilization  - Obtain PT/OT consults as needed  - Advance activity as appropriate  - Communicate ordered activity level and limitations with patient/family  Outcome: Progressing  Goal: Maintain proper alignment of affected body part  Description: INTERVENTIONS:  - Support and protect limb and body alignment per provider's orders  - Instruct and reinforce with patient and family use of appropriate assistive device and precautions (e.g. spinal or hip dislocation precautions)  Outcome: Progressing

## 2025-02-07 NOTE — PLAN OF CARE
Assumed care of pt at 2315. A&O x4. Denies pain, SOB, & cardiac symptoms.  Maintaining O2 on RA. NS on tele, S1&S2 present. Bowel sounds active. Continent of bowel and bladder.  Skin assessment performed w/ Gwen PCT. Skin intact. Bruising to BUE, bilateral groin, & genitalia.  Pt updated on plan of care. Bed in lowest position. Bed alarm on. Call light within reach.     /82 (104) this AM. Mihai Gavin MD notified. PRN IV hydralazine ordered.    Problem: Diabetes/Glucose Control  Goal: Glucose maintained within prescribed range  Description: INTERVENTIONS:  - Monitor Blood Glucose as ordered  - Assess for signs and symptoms of hyperglycemia and hypoglycemia  - Administer ordered medications to maintain glucose within target range  - Assess barriers to adequate nutritional intake and initiate nutrition consult as needed  - Instruct patient on self management of diabetes  Outcome: Progressing     Problem: CARDIOVASCULAR - ADULT  Goal: Maintains optimal cardiac output and hemodynamic stability  Description: INTERVENTIONS:  - Monitor vital signs, rhythm, and trends  - Monitor for bleeding, hypotension and signs of decreased cardiac output  - Evaluate effectiveness of vasoactive medications to optimize hemodynamic stability  - Monitor arterial and/or venous puncture sites for bleeding and/or hematoma  - Assess quality of pulses, skin color and temperature  - Assess for signs of decreased coronary artery perfusion - ex. Angina  - Evaluate fluid balance, assess for edema, trend weights  Outcome: Progressing  Goal: Absence of cardiac arrhythmias or at baseline  Description: INTERVENTIONS:  - Continuous cardiac monitoring, monitor vital signs, obtain 12 lead EKG if indicated  - Evaluate effectiveness of antiarrhythmic and heart rate control medications as ordered  - Initiate emergency measures for life threatening arrhythmias  - Monitor electrolytes and administer replacement therapy as ordered  Outcome:  Spoke with    Patients wife    regarding procedure scheduled on 2/25/2020  Arrival time 0730  Has patient been sick with fever or on antibiotics within the last 7 days? no  Has patient received a vaccination within the last 7 days? no  Has the patient stopped all medications as directed?  Yes, asprin and fish oil  last dose on 2/17/2020  Does patient have a pacemaker and or defibrillator? no  Does the patient have a ride to and from procedure and someone reliable to remain with patient? Yes, wife gonzalo  Is the patient diabetic? yes  Does the patient have sleep apnea? Or use O2 at home? No no  Is the patient receiving sedation? yes  Is the patient instructed to remain NPO beginning at midnight the night before their procedure? yes  Procedure location confirmed with patient? yes  Travel screening: negative     Progressing     Problem: GASTROINTESTINAL - ADULT  Goal: Minimal or absence of nausea and vomiting  Description: INTERVENTIONS:  - Maintain adequate hydration with IV or PO as ordered and tolerated  - Nasogastric tube to low intermittent suction as ordered  - Evaluate effectiveness of ordered antiemetic medications  - Provide nonpharmacologic comfort measures as appropriate  - Advance diet as tolerated, if ordered  - Obtain nutritional consult as needed  - Evaluate fluid balance  Outcome: Progressing  Goal: Maintains or returns to baseline bowel function  Description: INTERVENTIONS:  - Assess bowel function  - Maintain adequate hydration with IV or PO as ordered and tolerated  - Evaluate effectiveness of GI medications  - Encourage mobilization and activity  - Obtain nutritional consult as needed  - Establish a toileting routine/schedule  - Consider collaborating with pharmacy to review patient's medication profile  Outcome: Progressing  Goal: Maintains adequate nutritional intake (undernourished)  Description: INTERVENTIONS:  - Monitor percentage of each meal consumed  - Identify factors contributing to decreased intake, treat as appropriate  - Assist with meals as needed  - Monitor I&O, WT and lab values  - Obtain nutritional consult as needed  - Optimize oral hygiene and moisture  - Encourage food from home; allow for food preferences  - Enhance eating environment  Outcome: Progressing     Problem: GENITOURINARY - ADULT  Goal: Absence of urinary retention  Description: INTERVENTIONS:  - Assess patient’s ability to void and empty bladder  - Monitor intake/output and perform bladder scan as needed  - Follow urinary retention protocol/standard of care  - Consider collaborating with pharmacy to review patient's medication profile  - Implement strategies to promote bladder emptying  Outcome: Progressing     Problem: METABOLIC/FLUID AND ELECTROLYTES - ADULT  Goal: Glucose maintained within prescribed  range  Description: INTERVENTIONS:  - Monitor Blood Glucose as ordered  - Assess for signs and symptoms of hyperglycemia and hypoglycemia  - Administer ordered medications to maintain glucose within target range  - Assess barriers to adequate nutritional intake and initiate nutrition consult as needed  - Instruct patient on self management of diabetes  Outcome: Progressing  Goal: Electrolytes maintained within normal limits  Description: INTERVENTIONS:  - Monitor labs and rhythm and assess patient for signs and symptoms of electrolyte imbalances  - Administer electrolyte replacement as ordered  - Monitor response to electrolyte replacements, including rhythm and repeat lab results as appropriate  - Fluid restriction as ordered  - Instruct patient on fluid and nutrition restrictions as appropriate  Outcome: Progressing  Goal: Hemodynamic stability and optimal renal function maintained  Description: INTERVENTIONS:  - Monitor labs and assess for signs and symptoms of volume excess or deficit  - Monitor intake, output and patient weight  - Monitor urine specific gravity, serum osmolarity and serum sodium as indicated or ordered  - Monitor response to interventions for patient's volume status, including labs, urine output, blood pressure (other measures as available)  - Encourage oral intake as appropriate  - Instruct patient on fluid and nutrition restrictions as appropriate  Outcome: Progressing     Problem: MUSCULOSKELETAL - ADULT  Goal: Return mobility to safest level of function  Description: INTERVENTIONS:  - Assess patient stability and activity tolerance for standing, transferring and ambulating w/ or w/o assistive devices  - Assist with transfers and ambulation using safe patient handling equipment as needed  - Ensure adequate protection for wounds/incisions during mobilization  - Obtain PT/OT consults as needed  - Advance activity as appropriate  - Communicate ordered activity level and limitations with  patient/family  Outcome: Progressing  Goal: Maintain proper alignment of affected body part  Description: INTERVENTIONS:  - Support and protect limb and body alignment per provider's orders  - Instruct and reinforce with patient and family use of appropriate assistive device and precautions (e.g. spinal or hip dislocation precautions)  Outcome: Progressing

## 2025-02-07 NOTE — PROGRESS NOTES
Cardiology Progress Note      Jeff Morales Patient Status:  Inpatient    1945 MRN [id number]   Location [facility] 4SW-A Attending Eduin Hatch MD   Hosp Day # 2 PCP Hina Comer MD     Reason for consultation:  Post op    Impression:  Dr. Hatch: s/p stent graft repair of AAA  CABG  DMII  ICD  HLD  CKD III    Plan:  Post op: doing well and hemodynamically stable. Now on regular floor   Continue DAPT  Continue nifedipine  Continue metoprolol   Echo done in  with normal LV function and mild valvular disease.   Will repeat echo while inpt given systolic murmur and previous mild aortic stenosis     Subjective:  No acute issues         History of Present Illness:  Jeff Morales is a 79 year old male who presented to [facility] on 2025.    This is a patient of my partner: Dr. Villeda .    The patient has a complex past medical history as above who presents for AAA repair with Dr. Arnold.  Currently doing well in the ICU without any issues.  He had an uncomplicated surgery..    Cardiology consultation was requested.    Medications:  Current Facility-Administered Medications   Medication Dose Route Frequency    hydrALAzine (Apresoline) 20 mg/mL injection 10 mg  10 mg Intravenous Q2H PRN    NIFEdipine ER (Procardia-XL) 24 hr tab 30 mg  30 mg Oral Daily    acetaminophen (Tylenol) tab 650 mg  650 mg Oral Q4H PRN    Or    HYDROcodone-acetaminophen (Norco) 5-325 MG per tab 1 tablet  1 tablet Oral Q4H PRN    Or    HYDROcodone-acetaminophen (Norco) 5-325 MG per tab 2 tablet  2 tablet Oral Q4H PRN    ondansetron (Zofran) 4 MG/2ML injection 4 mg  4 mg Intravenous Q6H PRN    metoclopramide (Reglan) 5 mg/mL injection 5 mg  5 mg Intravenous Q8H PRN    morphINE PF 2 MG/ML injection 1 mg  1 mg Intravenous Q2H PRN    Or    morphINE PF 2 MG/ML injection 2 mg  2 mg Intravenous Q2H PRN    Or    morphINE PF 4 MG/ML injection 4 mg  4 mg Intravenous Q2H PRN    aspirin DR tab 81 mg  81 mg Oral Daily     famotidine (Pepcid) tab 20 mg  20 mg Oral Nightly    Or    famotidine (Pepcid) 20 mg/2mL injection 20 mg  20 mg Intravenous Nightly    allopurinol (Zyloprim) tab 100 mg  100 mg Oral BID    atorvastatin (Lipitor) tab 40 mg  40 mg Oral Nightly    gabapentin (Neurontin) cap 200 mg  200 mg Oral TID    ezetimibe (Zetia) tab 10 mg  10 mg Oral Nightly    sodium bicarbonate tab 325 mg  325 mg Oral Daily    traMADol (Ultram) tab 50 mg  50 mg Oral BID PRN    glucose (Dex4) 15 GM/59ML oral liquid 15 g  15 g Oral Q15 Min PRN    Or    glucose (Glutose) 40% oral gel 15 g  15 g Oral Q15 Min PRN    Or    glucose-vitamin C (Dex-4) chewable tab 4 tablet  4 tablet Oral Q15 Min PRN    Or    dextrose 50% injection 50 mL  50 mL Intravenous Q15 Min PRN    Or    glucose (Dex4) 15 GM/59ML oral liquid 30 g  30 g Oral Q15 Min PRN    Or    glucose (Glutose) 40% oral gel 30 g  30 g Oral Q15 Min PRN    Or    glucose-vitamin C (Dex-4) chewable tab 8 tablet  8 tablet Oral Q15 Min PRN    insulin aspart (NovoLOG) 100 Units/mL FlexPen 1-10 Units  1-10 Units Subcutaneous TID AC and HS    metoprolol succinate ER (Toprol XL) 24 hr tab 25 mg  25 mg Oral QPM    benzocaine-menthol (Cepacol) lozenge 1 lozenge  1 lozenge Oral PRN       Past Medical History:    Abdominal aneurysm    Aortic insufficiency    Atherosclerosis of coronary artery    Diabetes (HCC)    High blood pressure    High cholesterol    Mitral regurgitation    Neuropathy    Peripheral vascular disease    PVD (peripheral vascular disease)    Tricuspid insufficiency    Unstable angina (HCC)       Past Surgical History:   Procedure Laterality Date    Angiogram      left heart catheterization january 2004    Back surgery      Bypass surgery  01/2004    CABG SVG to Diag and Right PDA, [place] to LAD,    Nasal surg proc unlisted      Nasal surgery         Family History  There is no family history of sudden cardiac death.    Social History   reports that he has quit smoking. His smoking use included  cigarettes. He has never used smokeless tobacco. He reports current alcohol use. He reports that he does not use drugs.     Allergies  Allergies[1]      Review of Systems:  Constitutional: negative for fevers  Eyes: negative for visual disturbance  Ears, nose, mouth, throat, and face: negative for epistaxis  Respiratory: negative for dyspnea on exertion  Cardiovascular: negative for chest pain  Gastrointestinal: negative for melena  Genitourinary:negative for hematuria  Hematologic/lymphatic: negative for bleeding  Musculoskeletal:negative for myalgias  Neurological: negative for dizziness and headaches  Endocrine: negative for temperature intolerance      Physical Exam:  Blood pressure 115/66, pulse 86, temperature 98.1 °F (36.7 °C), temperature source Oral, resp. rate 18, height 5' 9\" (1.753 m), weight 184 lb 15.5 oz (83.9 kg), SpO2 98%.  Temp (24hrs), Av.5 °F (36.9 °C), Min:98.1 °F (36.7 °C), Max:98.9 °F (37.2 °C)    Wt Readings from Last 3 Encounters:   25 184 lb 15.5 oz (83.9 kg)   22 187 lb (84.8 kg)   21 184 lb (83.5 kg)       General: Awake and alert; in no acute distress  HEENT: Extraocular movements are intact; sclerae are anicteric; scalp is atrauamatic; no thyromegaly  Neck: Supple; no JVD; no carotid bruits  Cardiac: Regular rate and regular rhythm; no rubs/gallops are appreciated; PMI is non-displaced; there is no evidence of a sternal heave. 2/6 KELSI.   Lungs: Clear to auscultation bilaterally; no accessory muscle use is noted  Abdomen: Soft, non-tender; bowel sounds are normoactive; no hepatosplenomegaly  Extremities: No clubbing or cyanosis; moves all 4 extremities normally  Psychiatric: Normal mood and affect; answers questions appropriately  Dermatologic: No rashes; normal skin turgor    Diagnostic testing:    EKG: Normal sinus rhythm    Labs:   Lab Results   Component Value Date    INR 1.07 2025        Lab Results   Component Value Date    WBC 7.3 2025    HGB 7.8  02/07/2025    HCT 23.0 02/07/2025    .0 02/07/2025    CREATSERUM 1.84 02/07/2025    BUN 26 02/07/2025     02/07/2025    K 4.3 02/07/2025     02/07/2025    CO2 22.0 02/07/2025    GLU 99 02/07/2025    CA 8.5 02/07/2025    ALB 3.3 02/07/2025    ALKPHO 61 02/07/2025    BILT 0.4 02/07/2025    TP 5.4 02/07/2025    AST 15 02/07/2025    ALT <7 02/07/2025    PGLU 106 02/07/2025         Thank you for allowing our practice to participate in the care of your patient. Please do not hesitate to contact me if you have any questions.    Jose Manuel Simons MD             [1] No Known Allergies

## 2025-02-07 NOTE — PROGRESS NOTES
Reviewed discharge plan with son and patient. Verbalized understanding. IV removed. Tele removed. All questions answered. Son as discharge transportation.

## 2025-02-07 NOTE — DISCHARGE INSTRUCTIONS
No shower 2 days. Call office for temperature> 100.5/ wound drainage. No underwear for one week. No driving for 10 days. No lifting more than 3 pounds for 4 weekls.

## 2025-02-07 NOTE — PROGRESS NOTES
[facility]   part of [facility]     Hospitalist Progress Note     Jeff Morales Patient Status:  Inpatient    1945 MRN [id number]   [facility] 4SW-A Attending Eduin Hatch MD   Hosp Day # 1 PCP Hina Comer MD     Chief Complaint: groin bruising    Subjective:     Patient denies any complaints.  Feels fine    Objective:    Review of Systems:   6  point ROS completed and was negative, except for pertinent positive and negatives stated in subjective.    Vital signs:  Temp:  [98.2 °F (36.8 °C)-99 °F (37.2 °C)] 98.2 °F (36.8 °C)  Pulse:  [] 68  Resp:  [10-26] 16  BP: (116-186)/(54-92) 156/74  SpO2:  [85 %-98 %] 96 %  AO: (119-175)/(53-83) 131/58    Physical Exam:    General: No acute distress.   Respiratory: Clear to auscultation bilaterally. No wheezes. No rhonchi.  Cardiovascular: S1, S2. Regular rate and rhythm.   Abdomen: Soft, nontender, nondistended.    Extremities: No edema.  Groin site bruising  Diagnostic Data:    Labs:  Recent Labs   Lab 25  0905 25  1206 25  0402   WBC 7.8 6.7 8.6   HGB 12.8* 9.9* 7.9*   .0* 103.6* 104.5*   .0 153.0 139.0*   INR 1.07  --   --        Recent Labs   Lab 25  0905 25  1206 25  0402   * 132* 86   BUN 29* 24* 26*   CREATSERUM 1.91* 1.64* 1.81*   CA 9.4 8.2* 8.0*   ALB 4.4  --  2.9*    138 139   K 4.2 4.4 4.7    109 110   CO2 25.0 22.0 22.0   ALKPHO 80  --  57   AST 24  --  13   ALT 13  --  <7*   BILT 0.4  --  0.3   TP 7.5  --  5.0*       Estimated Creatinine Clearance: 32.6 mL/min (A) (based on SCr of 1.81 mg/dL (H)).    Recent Labs   Lab 25  0905   PTP 14.0   INR 1.07            COVID-19 Lab Results    COVID-19  No results found for: \"COVID19\"    Pro-Calcitonin  No results for input(s): \"PCT\" in the last 168 hours.    Cardiac  No results for input(s): \"TROP\", \"PBNP\" in the last 168 hours.    Creatinine Kinase  No results for input(s): \"CK\" in the last 168  hours.    Inflammatory Markers  No results for input(s): \"CRP\", \"TAMELA\", \"LDH\", \"DDIMER\" in the last 168 hours.    No results for input(s): \"TROP\", \"TROPHS\", \"CK\" in the last 168 hours.    Imaging: Imaging data reviewed in Epic.    Medications:    NIFEdipine ER  30 mg Oral Daily    aspirin  81 mg Oral Daily    clopidogrel  75 mg Oral Daily    famotidine  20 mg Oral Nightly    Or    famotidine  20 mg Intravenous Nightly    allopurinol  100 mg Oral BID    atorvastatin  40 mg Oral Nightly    gabapentin  200 mg Oral TID    ezetimibe  10 mg Oral Nightly    sodium bicarbonate  325 mg Oral Daily    insulin aspart  1-10 Units Subcutaneous TID AC and HS    metoprolol succinate ER  25 mg Oral QPM       Assessment & Plan:      Status post endovascular stent graft repair of AAA by vascular surgery Dr. Hatch-managed by vascular surgeon-aspirin/statin     #Diabetes mellitus type 2 with peripheral neuropathy-monitor on insulin    #Hypertension-Continue metoprolol and nifedipine ER with holding parameters    #Hyperlipidemia-Continue statin    #CAD with previous CABG-Continue baby aspirin,  statin, metoprolol    #CKD stage III with GFR of 35-42-monitor; stable and near baseline    #blood loss anemia-stable; doubt any active bleeding currently    #thrombocytopenia-monitor    #murmur-echo per cards    Plan of care discussed with patient and RN    Mrak Leslie MD    Supplementary Documentation:     Quality:  DVT Prophylaxis: scds  CODE status: see chart  Kirkpatrick: no  Central line: no      Estimated date of discharge: today? When ok with cards/renal/vascular  At this point Mr. Morales is expected to be discharge to: home

## 2025-02-08 LAB
BLOOD TYPE BARCODE: 6200
UNIT VOLUME: 350 ML

## 2025-02-10 NOTE — DISCHARGE SUMMARY
[facility]    PATIENT'S NAME: TIFFANIE SIERRA   ATTENDING PHYSICIAN: Eduin Hatch M.D.   PATIENT ACCOUNT#:   [id number]    LOCATION:  [address] [place] [address]  MEDICAL RECORD #:   [id number]       YOB: 1945  ADMISSION DATE:       02/05/2025      DISCHARGE DATE:  02/07/2025    DISCHARGE SUMMARY    HISTORY AND HOSPITAL COURSE:  An 79-year-old white male overall doing well, seen with his son, Hernandez, at bedside.  Neurologically, he is intact.  No chest pain or shortness of breath.  He had an echo done today per Cardiology.  His blood pressure is better controlled today with an increased amount of his medications.  He is to follow up with his primary doctor and cardiologist, Dr. Del Toro, and Dr. Najera.  The patient had hydralazine given yesterday, and he has metoprolol succinate 25 mg b.i.d., nifedipine XL 30 mg once a day.  He is also to continue with his atorvastatin and the Zetia and baby aspirin.  Wounds are clean and dry.  Neurologically, he is intact.  All questions were answered.  We will discharge home if okay with the other doctors.  His hemoglobin was 7.9 yesterday, 7.8 today.  Renal function seems to be unchanged with a creatinine of 1.84, BUN of 26, and a GFR of 37 for the only left renal artery and left kidney that he has.  He was recommended also to follow up with his primary doctor regarding his hemoglobin, as he was slightly anemic even before the surgery.  His hemoglobin earlier before the surgery was 9.9, but was 12.8 before the operation.  Most of this I think the blood loss is from the closure device in the groins as well as loss of blood from the aneurysm.  He was instructed to take just iron medications.  All questions have been answered for the patient.     Dictated By Eduin Hatch M.D.  d: 02/07/2025 12:29:07  t: 02/07/2025 12:59:00  Job [id number]  JJW/

## 2025-02-10 NOTE — PAYOR COMM NOTE
--------------  DISCHARGE REVIEW    Payor: [employer]  Subscriber #:  [id number]  Authorization Number: [id number]    Admit date: 2/5/25  Admit time:   6:27 AM  Discharge Date: 2/7/2025  4:47 PM     Admitting Physician: Eduin Hatch MD  Attending Physician:  No att. providers found  Primary Care Physician: Hina Comer MD          Operative Report signed by Eduin Hatch MD at 2/7/2025 12:37 PM    Author: Eduin Hatch MD Service: Vascular Surgery Author Type: Physician   Filed: 2/7/2025 12:37 PM Status: Signed   : Eduin Hatch MD (Physician)      [facility]     PATIENT'S NAME: TIFFANIE SIERRA   ATTENDING PHYSICIAN: Eduin Hatch M.D.   OPERATING PHYSICIAN: Eduin Hatch M.D.   PATIENT ACCOUNT#:   [id number]    LOCATION:  [address] [place] [address]  MEDICAL RECORD #:   [id number]       YOB: 1945  ADMISSION DATE:       02/05/2025      OPERATION DATE:  02/05/2025     OPERATIVE REPORT     PREOPERATIVE DIAGNOSIS:  Enlarging abdominal aortic aneurysm about 5.3 to 5.4 cm with slight ectasia of both iliac arteries.    POSTOPERATIVE DIAGNOSIS:  Enlarging abdominal aortic aneurysm about 5.3 to 5.4 cm with slight ectasia of both iliac arteries.    PROCEDURE:    1.       Percutaneous duplex ultrasound access of the right and left common femoral arteries with micropuncture technique and placement of 2 Perclose closure devices on both the right and left side.  2.       Aortic angiogram with suprarenal pigtail catheter and imaging of abdominal aortic aneurysm.  3.       Placement of a [place] Excluder stent graft, main body right through a 16-[place] DrySeal sheath of 28.5 mm x 14.5 mm x 12 cm, with a contralateral stent graft/limb of 16 mm x 16 mm x 14 cm through a 12-[place] DrySeal sheath, with a right limb extension of 16 mm x 20 mm x 10 cm.   Closure of right femoral artery with two perclose/ 8Fr Angioseal/ left femorasl closure 2 Perclose     INDICATIONS:  This is  an 79-year-old white male with enlarging abdominal aortic aneurysm, referred by Dr. Kevin Del Toro/Dr. Geo Najera.  He had a 5.4 cm aneurysm.  It was increased from 2019 when it was 4.5 cm and a little bit over 5 cm last summer.  The patient was recommended for a stent graft repair with the risks and complications including death, myocardial infarction, bleeding, infection, limb loss, graft thrombosis, future limb loss, future graft thrombosis, renal failure with dialysis, colon ischemia, and multisystem organ failure.  Risks and complications of not doing the procedure were also discussed.  The patient has only 1 functioning kidney, and there was a small accessory, less than 1 to 2 mm, artery coming off the aneurysm going to the left renal artery pole distally, which would be sacrificed for the procedure.  Discussed medical management alone and just watching versus open repair versus stent graft repair.  The patient and family members wished to have the procedure done.  All questions answered.     OPERATIVE TECHNIQUE:  The patient was placed supine on procedure table, underwent general anesthesia, received preoperative antibiotics.  A Kirkpatrick catheter was placed by Surgery, arterial lines and IV by Anesthesia.  The patient had preoperative antibiotics.  Ioban Vi-Drape was used to cover the operative field.  Both groins were prepped and draped in the usual sterile technique.  SCDs on both lower legs.       After a time-out was done, duplex ultrasound was used to access both the right and left common femoral arteries with micropuncture technique and eventually placement of a J-wire with the use of a Glidewire and a Kumpe catheter due to stenosis and tortuosity of both common iliac arteries at their origin.  The patient eventually had the Perclose closure devices x2 placed in both the right and left common femoral arteries.     The patient, over a Kumpe catheter with a J-wire had been placed in the aorta, had  Amplatz Super Stiff 7 cm floppy-tip 108 cm placed in both the right and left side.  The patient then had the artery dilated with a 6-[place] prior to placement of Perclose closure devices, then with an 8-[place] sheath, and the patient was fully anticoagulated at this time.     After a greater than 5-minute period of time and ACTs had to be monitored, he got initially 11,000, then he got an additional 4000, and then finally 2000 for the ACT to finally come up.  He had the main body right placed through the 16-[place] DrySeal sheath placed up on the right side after angiogram was performed showing where the infrarenal aorta and the left renal artery were.  It was at least a 3.5 cm neck.  The device was brought up with proper craniocaudal view of at least 25 to 30 degrees and deployed below the left renal artery.     The contralateral limb was cannulated with the use of a J-wire that was kept in for a dc wire into the abdominal aortic aneurysm, and then a Kumpe catheter was brought up, and that readily cannulated the contralateral limb.  The Kumpe catheter was advanced, and eventually a J-wire was placed with eventually a pigtail catheter placed, rotated within to make sure it was in, and a small puff of contrast was done under fluoroscopy to confirm within the graft.     The Amplatz Super Stiff guide was then placed up and the pigtail catheter kept in position, and through a retrograde injection from the left groin, the left iliac bifurcation was identified.  The patient then had the contralateral limb placed of 16 mm x 16 mm x 14 cm right to the iliac bifurcation and just 0.5 mm above the flow divider.     The patient then had the ipsilateral limb main portion deployed and the limb was then cannulated with a retrograde angiogram from the [place] DrySeal sheath showing the iliac bifurcation, and then through this was placed an ipsilateral limb extension of 16 x 20 x 10 cm.  It ended right at the iliac  bifurcation.     MOB balloons were placed on both the right and left side, and the graft was splayed up against the aorta just above the renal artery, below the renal artery, and just at the area where there was a knuckle of aorta going into the aneurysm.  Care was taken not to overdistend this area with concern of rupture.     This was done down the length of both iliac arteries over the overlapped areas and distally, especially where there was a tight stenosis of the iliac arteries from the initial procedure.     A pigtail catheter placed up again.  An angiogram was performed showing perfusion still of the left renal artery with the left kidney open and seemed to be about the same as before, both internal iliac arteries open, and there was no evidence for any type 1, type 2, type 3, or type 4 endoleak.     The patient then had J wires placed on both groins, and the sheath was withdrawn, with hemostasis obtained on the right side with the 2 Perclose devices but put an additional 8-[place] Angio-Seal for hemostasis on the left side with the 2 Perclose devices.  The patient received protamine at the end of the procedure.  The vital signs remained stable.  Estimated blood loss maybe 150 to 200 mL, mostly from the closure of the right side.  The patient received 120 mL of contrast.  Fluoro time was 24.6 minutes.  DAP was 168,149.  The patient received 1.5 L of crystalloid.  Good pulses in the feet at the end of the procedure.  The wounds were clean and dry at the end of the procedure.       FINAL DIAGNOSIS:  Enlarging abdominal aortic aneurysm treated with endovascular stent graft repair as above.     Dictated By Eduin Hatch M.D.  d:02/05/2025 11:41:45  t:02/05/2025 15:45:34  [id number]  JW/     cc:Dr. Kevin Najera

## 2025-02-13 ENCOUNTER — OFFICE VISIT (OUTPATIENT)
Dept: INTERNAL MEDICINE CLINIC | Facility: CLINIC | Age: 80
End: 2025-02-13
Payer: COMMERCIAL

## 2025-02-13 VITALS
RESPIRATION RATE: 16 BRPM | TEMPERATURE: 99 F | BODY MASS INDEX: 25.92 KG/M2 | DIASTOLIC BLOOD PRESSURE: 80 MMHG | SYSTOLIC BLOOD PRESSURE: 158 MMHG | HEIGHT: 69.5 IN | OXYGEN SATURATION: 95 % | WEIGHT: 179 LBS | HEART RATE: 102 BPM

## 2025-02-13 DIAGNOSIS — N18.32 STAGE 3B CHRONIC KIDNEY DISEASE (HCC): ICD-10-CM

## 2025-02-13 DIAGNOSIS — Z86.79 S/P AORTIC ANEURYSM REPAIR: ICD-10-CM

## 2025-02-13 DIAGNOSIS — Z98.890 S/P AORTIC ANEURYSM REPAIR: ICD-10-CM

## 2025-02-13 DIAGNOSIS — I71.43 INFRARENAL ABDOMINAL AORTIC ANEURYSM (AAA) WITHOUT RUPTURE: Primary | ICD-10-CM

## 2025-02-13 DIAGNOSIS — D62 ABLA (ACUTE BLOOD LOSS ANEMIA): ICD-10-CM

## 2025-02-13 DIAGNOSIS — T14.8XXA MULTIPLE SKIN TEARS: ICD-10-CM

## 2025-02-13 DIAGNOSIS — T14.8XXA BLOOD BLISTER: ICD-10-CM

## 2025-02-13 DIAGNOSIS — E11.9 TYPE 2 DIABETES MELLITUS WITHOUT COMPLICATION, WITHOUT LONG-TERM CURRENT USE OF INSULIN (HCC): ICD-10-CM

## 2025-02-13 DIAGNOSIS — I10 PRIMARY HYPERTENSION: ICD-10-CM

## 2025-02-13 PROCEDURE — 3079F DIAST BP 80-89 MM HG: CPT | Performed by: NURSE PRACTITIONER

## 2025-02-13 PROCEDURE — 1170F FXNL STATUS ASSESSED: CPT | Performed by: NURSE PRACTITIONER

## 2025-02-13 PROCEDURE — 1111F DSCHRG MED/CURRENT MED MERGE: CPT | Performed by: NURSE PRACTITIONER

## 2025-02-13 PROCEDURE — 1160F RVW MEDS BY RX/DR IN RCRD: CPT | Performed by: NURSE PRACTITIONER

## 2025-02-13 PROCEDURE — 99214 OFFICE O/P EST MOD 30 MIN: CPT | Performed by: NURSE PRACTITIONER

## 2025-02-13 PROCEDURE — 3077F SYST BP >= 140 MM HG: CPT | Performed by: NURSE PRACTITIONER

## 2025-02-13 PROCEDURE — 1159F MED LIST DOCD IN RCRD: CPT | Performed by: NURSE PRACTITIONER

## 2025-02-13 PROCEDURE — 3008F BODY MASS INDEX DOCD: CPT | Performed by: NURSE PRACTITIONER

## 2025-02-13 RX ORDER — SODIUM FLUORIDE 1.5 MG/G
1 PASTE, DENTIFRICE DENTAL 2 TIMES DAILY
COMMUNITY

## 2025-02-13 RX ORDER — FERROUS SULFATE 325(65) MG
325 TABLET, DELAYED RELEASE (ENTERIC COATED) ORAL
COMMUNITY

## 2025-02-13 RX ORDER — METOPROLOL SUCCINATE 25 MG/1
25 TABLET, EXTENDED RELEASE ORAL EVERY EVENING
COMMUNITY

## 2025-02-13 RX ORDER — OMEGA-3-ACID ETHYL ESTERS 1 G/1
2 CAPSULE, LIQUID FILLED ORAL 2 TIMES DAILY
COMMUNITY

## 2025-02-13 RX ORDER — HYDROCODONE BITARTRATE AND ACETAMINOPHEN 5; 325 MG/1; MG/1
1 TABLET ORAL NIGHTLY PRN
COMMUNITY
Start: 2025-01-15

## 2025-02-13 NOTE — PROGRESS NOTES
[facility] PHARMACIST MEDICATION RECONCILIATION        Jeff Morales MRN [id number]    1945 PCP Hina Comer MD       Comments: Medication history completed by the [facility] Pharmacist with the patient and family in the office.     The following medication changes occurred while patient was hospitalized:  Medications Changed:  Aspirin 81mg tabs - 1 tablet daily    Outpatient Encounter Medications as of 2025   Medication Sig    HYDROcodone-acetaminophen 5-325 MG Oral Tab Take 1 tablet by mouth nightly as needed for Pain.    Cyanocobalamin (VITAMIN B-12 OR) Take 1,500 mcg by mouth daily.    Specialty Vitamins Products (NERVIVE NERVE RELIEF) Oral Tab Take 1 tablet by mouth in the morning and 1 tablet before bedtime.    metoprolol succinate ER 25 MG Oral Tablet 24 Hr Take 1 tablet (25 mg total) by mouth every evening.    omega-3-acid ethyl esters (LOVAZA) 1 g Oral Cap Take 2 capsules (2 g total) by mouth 2 (two) times daily.    ferrous sulfate 325 (65 FE) MG Oral Tab EC Take 1 tablet (325 mg total) by mouth daily with breakfast.    aspirin 81 MG Oral Tab EC Take 1 tablet (81 mg total) by mouth daily.    cholecalciferol 25 MCG (1000 UT) Oral Tab Take 1 tablet (1,000 Units total) by mouth daily.    NIFEdipine ER 30 MG Oral Tablet 24 Hr Take 1 tablet (30 mg total) by mouth daily.    gabapentin 100 MG Oral Cap Take 2 capsules (200 mg total) by mouth 3 (three) times daily.    ezetimibe 10 MG Oral Tab Take 1 tablet (10 mg total) by mouth nightly.    allopurinol 100 MG Oral Tab Take 1 tablet (100 mg total) by mouth 2 (two) times daily.    Atorvastatin Calcium 40 MG Oral Tab Take 1 tablet (40 mg total) by mouth nightly.    glipiZIDE 5 MG Oral Tablet 24 Hr Take 1 tablet (5 mg total) by mouth daily.    Multiple Vitamins-Minerals (MULTIVITAMIN & MINERAL OR) Take 1 tablet by mouth daily.     Medication Adherence Assessment:   Patient reports taking all medications as prescribed.   Denies any lightheadedness, dizziness, or other adverse effects.  Family member brought the medication list to the appointment.  Reviewed medications and doses.  Patient states he no longer takes the Sodium Bicarbonate tablets or Tramadol.  Patient has started taking the iron supplementation once a day.  Recommended taking with a meal to protect the stomach.  Patient reports taking Hydrocodone/APAP at bedtime as needed for pain.  Patient taking the supplement Nervive for nerve pain twice daily.  Updated medication list with current medications, doses and frequencies.  Answered all questions.    Reviewed all medications in detail with patient including dose, indication, timing of administration, monitoring parameters, and potential side effects of medications.   Patient confirmed understanding.     Thank you,    Kimberley Augustine, PharmD, 2/13/2025, 3:32 PM  [facility]

## 2025-02-23 PROBLEM — Z98.890 S/P AORTIC ANEURYSM REPAIR: Status: ACTIVE | Noted: 2025-02-23

## 2025-02-23 PROBLEM — T14.8XXA MULTIPLE SKIN TEARS: Status: ACTIVE | Noted: 2025-02-23

## 2025-02-23 PROBLEM — T14.8XXA BLOOD BLISTER: Status: ACTIVE | Noted: 2025-02-23

## 2025-02-23 PROBLEM — D62 ABLA (ACUTE BLOOD LOSS ANEMIA): Status: ACTIVE | Noted: 2025-02-23

## 2025-02-23 PROBLEM — Z86.79 S/P AORTIC ANEURYSM REPAIR: Status: ACTIVE | Noted: 2025-02-23

## 2025-02-23 PROBLEM — E11.9 TYPE 2 DIABETES MELLITUS WITHOUT COMPLICATION, WITHOUT LONG-TERM CURRENT USE OF INSULIN (HCC): Status: ACTIVE | Noted: 2025-02-23

## 2025-02-23 PROBLEM — N18.32 STAGE 3B CHRONIC KIDNEY DISEASE (HCC): Status: ACTIVE | Noted: 2025-02-23

## 2025-02-24 NOTE — PROGRESS NOTES
TCM VISIT  [facility]      HISTORY   Chief complaint: Hospital follow-up    HPI: Jeff Morales is a 79 year old male here today for hospital follow up for infrarenal abdominal aortic aneurysm without rupture, s/p aortic aneurysm repair, DMII controlled, HTN, CKD 3, ABLA, multiple skin tears.  Jeff Morales was discharged from [facility]  to Home or Self Care.  Admit Date: 2/5/25. Discharge Date: 2/7/25.     Hospital Discharge Diagnosis:      Endovascular stent graft repair of AAA    Hospital stay was uncomplicated.  Jeff Morales was discharge with aspirin, metoprolol, postop restrictions and a referral to the [facility] for continued follow up.      Today, patient is being seen for hospital follow-up.  He reports doing okay since discharge.  He is accompanied by his wife at time of exam.    He was admitted post endovascular stent graft repair of AAA.  Postoperatively he was doing well.  Neurologically he was intact.  He had an echo done per cardiology.  His BP is better controlled today with increased amount of his medications.  He was directed to follow-up with PCP and cardiology.  He had hydralazine given yesterday and his metoprolol ER 25 mg twice daily and nifedipine XL 30 mg daily.  He is to continue with his atorvastatin and Zetia as well as baby aspirin.  Wounds were noted to be clean and dry.  He was cleared for discharge per consultants.  His hemoglobin was 7.9 on 2/6 and was 7.8 on 2/7.  Renal function was unchanged with creatinine of 1.84, BUN of 26, and GFR of 37, 4 only left renal artery and left kidney that he has.  He was recommended also to follow-up with his PCP regarding his hemoglobin as he was slightly anemic even before surgery.  Most of his anemia is thought to be blood loss from the closure device in the groins as well as loss of blood from the aneurysm.  He was instructed to take iron medications.  He was cleared for discharge and discharged home in stable  condition.    Interactive contact within 2 business days post discharge first initiated on Date: 2/13/2025      Allergies:  Allergies[1]   Current Meds:  Current Outpatient Medications   Medication Sig Dispense Refill    HYDROcodone-acetaminophen 5-325 MG Oral Tab Take 1 tablet by mouth nightly as needed for Pain.      Cyanocobalamin (VITAMIN B-12 OR) Take 1,500 mcg by mouth daily.      Specialty Vitamins Products (NERVIVE NERVE RELIEF) Oral Tab Take 1 tablet by mouth in the morning and 1 tablet before bedtime.      metoprolol succinate ER 25 MG Oral Tablet 24 Hr Take 1 tablet (25 mg total) by mouth every evening.      omega-3-acid ethyl esters (LOVAZA) 1 g Oral Cap Take 2 capsules (2 g total) by mouth 2 (two) times daily.      ferrous sulfate 325 (65 FE) MG Oral Tab EC Take 1 tablet (325 mg total) by mouth daily with breakfast.      aspirin 81 MG Oral Tab EC Take 1 tablet (81 mg total) by mouth daily. 30 tablet 11    cholecalciferol 25 MCG (1000 UT) Oral Tab Take 1 tablet (1,000 Units total) by mouth daily.      NIFEdipine ER 30 MG Oral Tablet 24 Hr Take 1 tablet (30 mg total) by mouth daily. 90 tablet 0    gabapentin 100 MG Oral Cap Take 2 capsules (200 mg total) by mouth 3 (three) times daily.      ezetimibe 10 MG Oral Tab Take 1 tablet (10 mg total) by mouth nightly.      allopurinol 100 MG Oral Tab Take 1 tablet (100 mg total) by mouth 2 (two) times daily.      Atorvastatin Calcium 40 MG Oral Tab Take 1 tablet (40 mg total) by mouth nightly.      glipiZIDE 5 MG Oral Tablet 24 Hr Take 1 tablet (5 mg total) by mouth daily.      Multiple Vitamins-Minerals (MULTIVITAMIN & MINERAL OR) Take 1 tablet by mouth daily.       No current facility-administered medications for this visit.       HISTORY: reconciled and reviewed with patient  Past Medical History:    Abdominal aneurysm    Aortic insufficiency    Atherosclerosis of coronary artery    Diabetes (HCC)    High blood pressure    High cholesterol    Mitral  regurgitation    Neuropathy    Peripheral vascular disease    PVD (peripheral vascular disease)    Tricuspid insufficiency    Unstable angina (HCC)      Past Surgical History:   Procedure Laterality Date    Angiogram      left heart catheterization january 2004    Back surgery      Bypass surgery  01/2004    CABG SVG to Diag and Right PDA, [place] to LAD,    Nasal surg proc unlisted      Nasal surgery        No family history on file.   Social History     Socioeconomic History    Marital status:    Tobacco Use    Smoking status: Former     Types: Cigarettes    Smokeless tobacco: Never   Vaping Use    Vaping status: Never Used   Substance and Sexual Activity    Alcohol use: Yes     Comment: social    Drug use: No     Social Drivers of Health     Food Insecurity: No Food Insecurity (2/5/2025)    NCSS - Food Insecurity     Worried About Running Out of Food in the Last Year: No     Ran Out of Food in the Last Year: No   Transportation Needs: No Transportation Needs (2/5/2025)    NCSS - Transportation     Lack of Transportation: No   Housing Stability: Not At Risk (2/5/2025)    NCSS - Housing/Utilities     Has Housing: Yes     Worried About Losing Housing: No     Unable to Get Utilities: No        Imaging & Consults:        Lab Results   Component Value Date/Time    WBC 7.3 02/07/2025 05:07 AM    HGB 7.8 (L) 02/07/2025 05:07 AM    HCT 23.0 (L) 02/07/2025 05:07 AM    .0 (L) 02/07/2025 05:07 AM    GLU 99 02/07/2025 05:07 AM     02/07/2025 05:07 AM    K 4.3 02/07/2025 05:07 AM     02/07/2025 05:07 AM    CO2 22.0 02/07/2025 05:07 AM    BUN 26 (H) 02/07/2025 05:07 AM    CREATSERUM 1.84 (H) 02/07/2025 05:07 AM    CA 8.5 (L) 02/07/2025 05:07 AM    MG 1.6 02/06/2025 04:02 AM    ALB 3.3 02/07/2025 05:07 AM    ALT <7 (L) 02/07/2025 05:07 AM    AST 15 02/07/2025 05:07 AM    INR 1.07 01/31/2025 09:05 AM    A1C 6.0 (H) 02/05/2025 12:06 PM       Immunization History   Administered Date(s) Administered     Covid-19 Vaccine Pfizer 30 mcg/0.3 ml 03/12/2021, 04/02/2021, 11/19/2021       ROS:  GENERAL: energy fair/stable, denies fever, weakness  SKIN: denies any skin changes, + left femoral incision with bruising present, + blood blister that popped during dressing removal to left femoral, + right femoral incision with bruising present, + skin tears present around dressing site, + bruising present to BL groin, upper legs, suprapubic area, and hips  EYES: denies blurred vision, eye pain  HEENT: denies ear pain, loss of hearing, sore throat  RESPIRATORY: denies cough, denies dyspnea with exertion  CARDIOVASCULAR: denies syncope, chest pain, fatigue, palpitations   GI: denies abdominal pain, melena, constipation, diarrhea, nausea, vomiting   MUSCULOSKELETAL: denies pain, states normal range of motion of extremities  NEUROLOGIC: denies confusion, + balance difficulty  PSYCHIATRIC: denies depression or anxiety  HEMATOLOGIC: + anemia, + bruising, denies bleeding    PHYSICAL EXAM:  Vitals:    02/13/25 1521   BP: 158/80   Pulse: 102   Resp: 16   Temp: 98.8 °F (37.1 °C)       GENERAL: well developed, well nourished, in no apparent distress, patient and wife are good historians  INTEGUMENTARY: warm, dry, no rashes, + left femoral incision with bruising present, + blood blister that popped during dressing removal to left femoral, + right femoral incision with bruising present, + skin tears present around dressing site, + bruising present to BL groin, upper legs, suprapubic area, and hips  HEENT: atraumatic, normocephalic, sclera white, conjunctivae pink  NECK: supple  CHEST: no chest tenderness  LUNGS: clear to auscultation bilaterally, no wheezes, rhonchi or rales, normal respiratory effort  CARDIO: S1, S2, RRR, + audible murmur  GI: + BS to all quadrants, no tenderness  MUSCULOSKELETAL: + ROM in all joints, no evidence of swelling, pain   EXTREMITIES: no cyanosis, or edema  NEURO: Oriented x3  PSYCHIATRIC: appropriate  affect    ASSESSMENT/ PLAN:   1. Infrarenal abdominal aortic aneurysm (AAA) without rupture/S/P aortic aneurysm repair/Multiple skin tears/blood blister  Tolerated procedure without acute complications except ABLA  Denies any chest pain/radiation  Denies any shortness of breath  Denies any incisional pain  Has left femoral incision site with significant bruising present  Has right femoral incision site with significant bruising present  Both dressings removed secondary to multiple skin tears and a left blood blister that had popped  BL groins cleansed with NS as well as skin tears and blood blisters  All areas patted dry  Triple antibiotic ointment was placed on skin tears/blood blister site and covered with Mepilex  Both groin sites were also covered with Mepilex  Discussed with patient that he should list tape/Tegaderm as an allergy since it caused multiple skin tears and blistering of the skin  Discharged on/changed  Aspirin 81 mg daily  Metoprolol ER 25 mg daily  Denies any nausea/vomiting  Denies any fever/chills  Postprocedural restrictions discussed  Call for any temperature greater then 100.5 or wound drainage  No underwear x 7 days  No driving x 10 days  No lifting greater than 3 pounds x 4 weeks  Tolerating an oral diet  Appetite is good/drinking well  Moving bowels/passing gas  Follow-up with  Nephmarcia Luu on 6/3 at 2:15 PM  Follow-up with Dr. Elena on 2/13 at 12:15 PM  Follow-up with surgical oncology Dr. Becerra on 2/17 at 9:30 AM  Follow-up with vascular surgery Dr. Hatch on 2/17 at 2:10 PM  Follow-up with Dr. Simons in 2 weeks  Follow-up with PCP Dr. Comer-patient requesting to make own PCP follow-up  All pertinent hospital records, labs, radiology from current hospitalization reviewed    2. Type 2 diabetes mellitus without complication, without long-term current use of insulin (HCC)  Controlled with A1c of 6.0%  Continue to check BS 3-4 times per day and keep log and bring with  to all follow-ups for review  Reports BS today was 106  Continue  Atorvastatin 40 mg nightly  Ezetimibe 10 mg nightly  Gabapentin 200 mg 3 times daily  Glipizide ER 10 mg daily  Not currently on ACE/ARB-may be due to to kidney function  Continue to monitor for S/S of hyper hypoglycemia  Further A1c monitoring per PCP    3. Primary hypertension  BP was slightly elevated at today's exam at 158/80 with HR of 102  Currently not checking BP at home  Discussed BP slightly elevated at today's exam and would recommend checking BP 2 times daily x 2 weeks in the morning before medications and at least 4 hours after medications and keep log of BP and HR to bring with to all follow-ups for review  Medications were adjusted in the hospital with improvement in his BP  Changed  Metoprolol ER 25 mg daily   Continue  Nifedipine ER 30 mg daily  Continue to monitor for S/S of hyper hypotension  Further medication adjustments per cardiology    4. Stage 3b chronic kidney disease (HCC)  Baseline creatinine around 1.9  Stable CKD throughout hospitalization  Kidney function at admission  BUN 24  Creatinine 1.64  GFR 4.2  Kidney function on 2/7 prior to discharge  BUN 26  Creatinine 1.84  GFR 37  Avoid nephrotoxic medications  No NSAIDs  Continue to monitor labs as directed    5. ABLA (acute blood loss anemia)  Hemoglobin was 12.8 prior to operation  Hemoglobin at admission postoperatively was 9.9  Hemoglobin on 2/7 prior to discharge was 7.8  Was recommended to take iron supplementation per vascular  Continue  Ferrous sulfate 325 mg daily  Patient is asymptomatic from anemia  Did not require blood transfusion in the hospital  Continue to monitor labs as directed        Orders Placed This Encounter    HYDROcodone-acetaminophen 5-325 MG Oral Tab     Sig: Take 1 tablet by mouth nightly as needed for Pain.    Cyanocobalamin (VITAMIN B-12 OR)     Sig: Take 1,500 mcg by mouth daily.    Specialty Vitamins Products (NERVIVE NERVE RELIEF) Oral Tab      Sig: Take 1 tablet by mouth in the morning and 1 tablet before bedtime.    metoprolol succinate ER 25 MG Oral Tablet 24 Hr     Sig: Take 1 tablet (25 mg total) by mouth every evening.    omega-3-acid ethyl esters (LOVAZA) 1 g Oral Cap     Sig: Take 2 capsules (2 g total) by mouth 2 (two) times daily.    ferrous sulfate 325 (65 FE) MG Oral Tab EC     Sig: Take 1 tablet (325 mg total) by mouth daily with breakfast.           Medications & Refills for this Visit:   HYDROcodone-acetaminophen 5-325 MG Oral Tab Take 1 tablet by mouth nightly as needed for Pain.      Cyanocobalamin (VITAMIN B-12 OR) Take 1,500 mcg by mouth daily.      Specialty Vitamins Products (NERVIVE NERVE RELIEF) Oral Tab Take 1 tablet by mouth in the morning and 1 tablet before bedtime.      metoprolol succinate ER 25 MG Oral Tablet 24 Hr Take 1 tablet (25 mg total) by mouth every evening.      omega-3-acid ethyl esters (LOVAZA) 1 g Oral Cap Take 2 capsules (2 g total) by mouth 2 (two) times daily.      ferrous sulfate 325 (65 FE) MG Oral Tab EC Take 1 tablet (325 mg total) by mouth daily with breakfast.      aspirin 81 MG Oral Tab EC Take 1 tablet (81 mg total) by mouth daily. 30 tablet 11    cholecalciferol 25 MCG (1000 UT) Oral Tab Take 1 tablet (1,000 Units total) by mouth daily.      NIFEdipine ER 30 MG Oral Tablet 24 Hr Take 1 tablet (30 mg total) by mouth daily. 90 tablet 0    gabapentin 100 MG Oral Cap Take 2 capsules (200 mg total) by mouth 3 (three) times daily.      ezetimibe 10 MG Oral Tab Take 1 tablet (10 mg total) by mouth nightly.      allopurinol 100 MG Oral Tab Take 1 tablet (100 mg total) by mouth 2 (two) times daily.      Atorvastatin Calcium 40 MG Oral Tab Take 1 tablet (40 mg total) by mouth nightly.      glipiZIDE 5 MG Oral Tablet 24 Hr Take 1 tablet (5 mg total) by mouth daily.      Multiple Vitamins-Minerals (MULTIVITAMIN & MINERAL OR) Take 1 tablet by mouth daily.       Requested Prescriptions      No prescriptions  requested or ordered in this encounter         Health Maintenance:  Health Maintenance   Topic Date Due    Diabetes Care Dilated Eye Exam  Never done    Zoster Vaccines (1 of 2) Never done    COVID-19 Vaccine (4 - 2024-25 season) 09/01/2024    Annual Well Visit  Never done    Diabetes Care: Foot Exam (Annual)  Never done    Diabetes Care: Microalb/Creat Ratio (Annual)  Never done    Diabetes Care A1C  08/05/2025    Diabetes Care: GFR  02/07/2026    Influenza Vaccine  Completed    Annual Depression Screening  Completed    Fall Risk Screening (Annual)  Completed    Pneumococcal Vaccine: 50+ Years  Completed    Meningococcal B Vaccine  Aged Out       Chronic Care Management Referral: N/A      Transitional Care Management Certification:  During the visit, I accessed [facility] and/or [facility] Everywhere and personally reviewed the following for the recent hospitalization: provider notes, consults, summaries, labs and other test results and the pertinent findings are documented below.     Medication Reconciliation:  I am aware of an inpatient discharge within the last 30 days.  The discharge medication list has been reconciled with the patient's current medication list and reviewed by me.  See medication list for additions of new medication, and changes to current doses of medications and discontinued medications.        Discharge Disposition/Plan:     Future Appointments   Date Time Provider Department Center   2/27/2025  8:00 AM  [facility]  [facility]   2/27/2025  4:30 PM  [facility]  [facility]      1.  Transitional Care Clinic Visit: Next visit Discharged  2.  PCP Visit: Patient requesting to make own PCP follow-up  3.  Chronic Care Management: N/A     LUCIO Garcia, 2/13/2025  [facility]  [phone]       [1] No Known Allergies

## 2025-02-27 ENCOUNTER — HOSPITAL ENCOUNTER (OUTPATIENT)
Dept: CT IMAGING | Facility: HOSPITAL | Age: 80
Discharge: HOME | End: 2025-02-27
Attending: SURGERY
Payer: MEDICARE

## 2025-02-27 ENCOUNTER — LAB ENCOUNTER (OUTPATIENT)
Dept: LAB | Facility: HOSPITAL | Age: 80
End: 2025-02-27
Attending: SURGERY
Payer: MEDICARE

## 2025-02-27 DIAGNOSIS — Z98.890 S/P AAA (ABDOMINAL AORTIC ANEURYSM) REPAIR: ICD-10-CM

## 2025-02-27 DIAGNOSIS — Z86.79 S/P AAA (ABDOMINAL AORTIC ANEURYSM) REPAIR: ICD-10-CM

## 2025-02-27 LAB
ALBUMIN SERPL-MCNC: 4.7 G/DL (ref 3.2–4.8)
ALBUMIN/GLOB SERPL: 1.8 {RATIO} (ref 1–2)
ALP LIVER SERPL-CCNC: 78 U/L
ALT SERPL-CCNC: 9 U/L
ANION GAP SERPL CALC-SCNC: 12 MMOL/L (ref 0–18)
AST SERPL-CCNC: 19 U/L (ref ?–34)
BILIRUB SERPL-MCNC: 0.7 MG/DL (ref 0.2–1.1)
BUN BLD-MCNC: 26 MG/DL (ref 9–23)
CALCIUM BLD-MCNC: 9.9 MG/DL (ref 8.7–10.6)
CHLORIDE SERPL-SCNC: 107 MMOL/L (ref 98–112)
CO2 SERPL-SCNC: 25 MMOL/L (ref 21–32)
CREAT BLD-MCNC: 2.23 MG/DL
EGFRCR SERPLBLD CKD-EPI 2021: 29 ML/MIN/1.73M2 (ref 60–?)
FASTING STATUS PATIENT QL REPORTED: YES
GLOBULIN PLAS-MCNC: 2.6 G/DL (ref 2–3.5)
GLUCOSE BLD-MCNC: 108 MG/DL (ref 70–99)
OSMOLALITY SERPL CALC.SUM OF ELEC: 303 MOSM/KG (ref 275–295)
POTASSIUM SERPL-SCNC: 4.3 MMOL/L (ref 3.5–5.1)
PROT SERPL-MCNC: 7.3 G/DL (ref 5.7–8.2)
SODIUM SERPL-SCNC: 144 MMOL/L (ref 136–145)

## 2025-02-27 PROCEDURE — 74176 CT ABD & PELVIS W/O CONTRAST: CPT | Performed by: SURGERY

## 2025-02-27 PROCEDURE — 80053 COMPREHEN METABOLIC PANEL: CPT

## 2025-02-27 PROCEDURE — 36415 COLL VENOUS BLD VENIPUNCTURE: CPT

## (undated) DEVICE — ANTIBACTERIAL UNDYED BRAIDED (POLYGLACTIN 910), SYNTHETIC ABSORBABLE SUTURE: Brand: COATED VICRYL

## (undated) DEVICE — SUT PROL 6-0 24IN C-1 NABSRB BLU 13MM 3/8 CIR

## (undated) DEVICE — STANDARD HYPODERMIC NEEDLE,POLYPROPYLENE HUB: Brand: MONOJECT

## (undated) DEVICE — Device

## (undated) DEVICE — TRAY CATH 16FR F INCL BARDX IC COMPLT CARE

## (undated) DEVICE — SUT PROL 7-0 24IN BV-1 NABSRB BLU 9.3MM 3/8 C

## (undated) DEVICE — CATHETER ANGIO 5FR L65CM 16MM CLOSER DST END PERFORMA

## (undated) DEVICE — RADIFOCUS GLIDECATH: Brand: GLIDECATH

## (undated) DEVICE — STERILE POLYISOPRENE POWDER-FREE SURGICAL GLOVES: Brand: PROTEXIS

## (undated) DEVICE — PINNACLE INTRODUCER SHEATH: Brand: PINNACLE

## (undated) DEVICE — RADIFOCUS GLIDEWIRE ADVANTAGE GUIDEWIRE: Brand: GLIDEWIRE ADVANTAGE

## (undated) DEVICE — GOWN SUR 2XL LEV 4 BLU W/ WHT V NK BND AERO

## (undated) DEVICE — UNDYED BRAIDED (POLYGLACTIN 910), SYNTHETIC ABSORBABLE SUTURE: Brand: COATED VICRYL

## (undated) DEVICE — RADIFOCUS GLIDEWIRE: Brand: GLIDEWIRE

## (undated) DEVICE — 3M™ IOBAN™ 2 ANTIMICROBIAL INCISE DRAPE 6651EZ: Brand: IOBAN™ 2

## (undated) DEVICE — PACK CV CUSTOM

## (undated) DEVICE — 3M™ TEGADERM™ TRANSPARENT FILM DRESSING FRAME STYLE, 1626W, 4 IN X 4-3/4 IN (10 CM X 12 CM), 50/CT 4CT/CASE: Brand: 3M™ TEGADERM™

## (undated) DEVICE — SLEEVE COMPR MD KNEE LEN SGL USE KENDALL SCD

## (undated) DEVICE — ADHESIVE SKIN TOP FOR WND CLSR DERMBND ADV

## (undated) DEVICE — PACK ANGIOGRAPHY CUSTOM

## (undated) DEVICE — BEACON TIP TORCON NB ADVANTAGE CATHETER: Brand: BEACON TIP TORCON NB

## (undated) DEVICE — FIXED CORE WIRE GUIDE SAFE-T-J, CURVED: Brand: COOK

## (undated) DEVICE — PERCLOSE™ PROSTYLE™ SUTURE-MEDIATED CLOSURE AND REPAIR SYSTEM: Brand: PERCLOSE™ PROSTYLE™

## (undated) DEVICE — 3M™ BAIR HUGGER® UNDERBODY BLANKET, FULL ACCESS, 10 PER CASE 63500: Brand: BAIR HUGGER™

## (undated) DEVICE — GUIDEWIRE: Brand: AMPLATZ SUPER STIFF™

## (undated) DEVICE — CATHETER PTCA BLLN L4CM INFL 10-37MM CATH

## (undated) NOTE — LETTER
Your patient was recently seen at the [facility] for a hospital follow-up visit. The visit note is attached. Please contact the clinic with any questions at [phone].    Thank you,  LUCIO Garcia

## (undated) NOTE — LETTER
Alban Jasso M.D., F.A.C.S.  Stuart Black M.D., F.A.C.S.  Patrice Toussaint M.D.   Librado Omalley M.D., F.A.C.S.  RUSS Delgado M.D., F.A.C.S.   Shayne Aparicio M.D., F.A.C.S.  Hernandez Shin M.D.    RUSS Falk M.D.   RUSS Xavier M.D., M.B.A.  Prudencio Galarza M.D.  RUSS Reardon M.D., F.A.C.S.  Mateus Trinidad M.D., F.A.C.S.   RUSS Odom M.D., F.A.C.S., F.A.C.C. Meño Kebede M.D.  Everette Stanley M.D.    NILSA Wheeler D.O., F.A.C.S.  Jason Jordan M.D.   Chay Gage M.D.  Aldo Acosta M.D.    Eduin Hatch M.D., F.A.C.S.        Welcome to [facility]    As you contemplate possible surgical treatment, it is very important to us that you understand fully what is being discussed, that all of your questions have been answered, and that your options for treatment have been fully explained.    To that end, on the following page we will ask you some questions to make certain that you understand everything which has been explained to you. Included in this understanding is that there are both surgical and nonsurgical treatments available for you, that you have options regarding where your care is given, and what doctors are involved in your care. Included in these options would, of course, be the option to elect for no treatment whatsoever. We especially want to be sure that you have had a chance to have all of your questions and concerns answered. If there are any issues which have not been adequately addressed, we ask you to bring them forward so that we can thoroughly address them.    A patient who is fully informed and understands their condition and options for treatment, as well as potential adverse effects of treatment, is going to be a patient who receives the most benefit out of care rendered.  Our goal in addition to providing excellent surgical care is to provide the necessary information to you and your family in order to make decisions which are appropriate relative to your own care.    Please take the time necessary to read and answer the questions on the next page. Again, if you have any questions, bring them forward and we will certainly address them.    Sincerely,    [facility]    Date:___________________ _______________________ _______________________       Printed Patient Name  Signature of Patient    Date:___________________ _______________________ _______________________       Printed Witness Name  Signature of Witness    _______________________       Relationship of Witness to Patient        Consent Form Page: 1 of 2  [id number] [facility] * [facility] [address] * [place] [address] * [phone] / [phone] *  Fax [phone] * Billing Fax [phone] Version: 9/9/2024    [facility]  Supplemental Consent Form    A [facility] surgeon has met with me and explained the matter of my illness, and what treatments might be available to improve my condition. As a result of that conversation, I understand the following:    A [facility] surgeon met with me and explained, in detail, the nature of my condition for which surgery is being contemplated. The procedure being performed is:  ENDOVASCULAR STENT GRAFT REPAIR     Yes _____ No _____    A [facility] surgeon met with me and explained to me that there are alternatives to surgery which may include no surgery, medical therapy, or interventional treatment, among other options and the risks and benefits of the different treatment options:Yes _____ No _____    A [facility] surgeon has explained to me that if I should desire, he/she is willing to explain my case and the surgical and non-surgical options to family members:            Yes _____ No _____    A [facility] surgeon has answered all of my questions  regarding the topics we have discussed. I have been invited to ask more questions:  Yes _____ No _____    A [facility] surgeon has explained to me that if I seek other options or wish treatment at elsewhere, that his/her office will assist me in making such recommendations:  Yes _____ No _____    A [facility] surgeon has explained to me that death, risk of bleeding, stroke, multi-organ failure, heart attack and/or other complications are risks for the proposed surgical procedure:        Yes _____ No _____    A [facility] surgeon has explained to me that I have the right to cancel or postpone the surgery at any time prior to the start of surgery:    Yes _____ No _____    The nature and options for treatment for my condition has been explained to me, in detail, by a [facility] surgeon and all questions have been answered to my satisfaction. I understand that I am not required to undergo surgery, and further, that if I so desire, I could have surgery accomplished by another surgeon or at another institution. I understand and accept that which has been explained to me. I am able to make my decisions knowingly and willingly based on the data.    Date:___________________ _______________________ _______________________       Printed Patient Name  Signature of Patient    Date:___________________ _______________________ _______________________       Printed Witness Name  Signature of Witness    _______________________   Relationship of Witness to Patient          Consent Form Page: 3 of  2  [address] [facility] * [address] * [place] [address] * [phone] / [phone] *  Fax [phone] * Billing Fax [phone] Version: 9/9/2024

## (undated) NOTE — LETTER
Alban Jasso M.D., F.A.C.S.  Stuart Black M.D., F.A.C.S.  Patrice Toussaint M.D.   Librado Omalley M.D., F.A.C.S.  RUSS Delgado M.D., F.A.C.S.   Shayne Aparicio M.D., F.A.C.S.  Hernandez Shin M.D.    RUSS Falk M.D.   RUSS Xavier M.D., M.B.A.  Prudencio Galarza M.D.  RUSS Reardon M.D., F.A.C.S.  Mateus Trinidad M.D., F.A.C.S.   RUSS Odom M.D., F.A.C.S., F.A.C.C. Meño Kebede M.D.  Everette Stanley M.D.    NILSA Wheeler D.O., F.A.C.S.  Jason Jordan M.D.   Chay Gage M.D.  Aldo Acosta M.D.    Eduin Hatch M.D., F.A.C.S.        Welcome to [facility]    As you contemplate possible surgical treatment, it is very important to us that you understand fully what is being discussed, that all of your questions have been answered, and that your options for treatment have been fully explained.    To that end, on the following page we will ask you some questions to make certain that you understand everything which has been explained to you. Included in this understanding is that there are both surgical and nonsurgical treatments available for you, that you have options regarding where your care is given, and what doctors are involved in your care. Included in these options would, of course, be the option to elect for no treatment whatsoever. We especially want to be sure that you have had a chance to have all of your questions and concerns answered. If there are any issues which have not been adequately addressed, we ask you to bring them forward so that we can thoroughly address them.    A patient who is fully informed and understands their condition and options for treatment, as well as potential adverse effects of treatment, is going to be a patient who receives the most benefit out of care rendered.  Our goal in addition to providing excellent surgical care is to provide the necessary information to you and your family in order to make decisions which are appropriate relative to your own care.    Please take the time necessary to read and answer the questions on the next page. Again, if you have any questions, bring them forward and we will certainly address them.    Sincerely,    [facility]    Date:___________________ _______________________ _______________________       Printed Patient Name  Signature of Patient    Date:___________________ _______________________ _______________________       Printed Witness Name  Signature of Witness    _______________________       Relationship of Witness to Patient        Consent Form Page: 1 of 2  [id number] [facility] * [facility] [address] * [place] [address] * [phone] / [phone] *  Fax [phone] * Billing Fax [phone] Version: 9/9/2024    [facility]  Supplemental Consent Form    A [facility] surgeon has met with me and explained the matter of my illness, and what treatments might be available to improve my condition. As a result of that conversation, I understand the following:    A [facility] surgeon met with me and explained, in detail, the nature of my condition for which surgery is being contemplated. The procedure being performed is:  ENDOVASCULAR STENT GRAFT REPAIR     Yes _____ No _____    A [facility] surgeon met with me and explained to me that there are alternatives to surgery which may include no surgery, medical therapy, or interventional treatment, among other options and the risks and benefits of the different treatment options:Yes _____ No _____    A [facility] surgeon has explained to me that if I should desire, he/she is willing to explain my case and the surgical and non-surgical options to family members:            Yes _____ No _____    A [facility] surgeon has answered all of my questions  regarding the topics we have discussed. I have been invited to ask more questions:  Yes _____ No _____    A [facility] surgeon has explained to me that if I seek other options or wish treatment at elsewhere, that his/her office will assist me in making such recommendations:  Yes _____ No _____    A [facility] surgeon has explained to me that death, risk of bleeding, stroke, multi-organ failure, heart attack and/or other complications are risks for the proposed surgical procedure:        Yes _____ No _____    A [facility] surgeon has explained to me that I have the right to cancel or postpone the surgery at any time prior to the start of surgery:    Yes _____ No _____    The nature and options for treatment for my condition has been explained to me, in detail, by a [facility] surgeon and all questions have been answered to my satisfaction. I understand that I am not required to undergo surgery, and further, that if I so desire, I could have surgery accomplished by another surgeon or at another institution. I understand and accept that which has been explained to me. I am able to make my decisions knowingly and willingly based on the data.    Date:___________________ _______________________ _______________________       Printed Patient Name  Signature of Patient    Date:___________________ _______________________ _______________________       Printed Witness Name  Signature of Witness    _______________________   Relationship of Witness to Patient          Consent Form Page: 3 of  2  [address] [facility] * [address] * [place] [address] * [phone] / [phone] *  Fax [phone] * Billing Fax [phone] Version: 9/9/2024